# Patient Record
Sex: FEMALE | Race: WHITE | URBAN - METROPOLITAN AREA
[De-identification: names, ages, dates, MRNs, and addresses within clinical notes are randomized per-mention and may not be internally consistent; named-entity substitution may affect disease eponyms.]

---

## 2017-03-02 PROBLEM — Z00.00 ENCOUNTER FOR PREVENTIVE HEALTH EXAMINATION: Noted: 2017-03-02

## 2017-05-19 ENCOUNTER — INPATIENT (INPATIENT)
Facility: HOSPITAL | Age: 82
LOS: 2 days | Discharge: HOME | End: 2017-05-22
Attending: HOSPITALIST | Admitting: FAMILY MEDICINE
Payer: MEDICARE

## 2017-05-19 VITALS
TEMPERATURE: 98 F | WEIGHT: 139.99 LBS | RESPIRATION RATE: 18 BRPM | SYSTOLIC BLOOD PRESSURE: 103 MMHG | DIASTOLIC BLOOD PRESSURE: 61 MMHG | HEART RATE: 110 BPM | OXYGEN SATURATION: 100 %

## 2017-05-19 LAB
ALBUMIN SERPL ELPH-MCNC: 3.6 G/DL — SIGNIFICANT CHANGE UP (ref 3.3–5)
ALP SERPL-CCNC: 73 U/L — SIGNIFICANT CHANGE UP (ref 40–120)
ALT FLD-CCNC: 17 U/L — SIGNIFICANT CHANGE UP (ref 12–78)
ANION GAP SERPL CALC-SCNC: 11 MMOL/L — SIGNIFICANT CHANGE UP (ref 5–17)
AST SERPL-CCNC: 12 U/L — LOW (ref 15–37)
BASOPHILS # BLD AUTO: 0.1 K/UL — SIGNIFICANT CHANGE UP (ref 0–0.2)
BASOPHILS NFR BLD AUTO: 0.9 % — SIGNIFICANT CHANGE UP (ref 0–2)
BILIRUB SERPL-MCNC: 0.4 MG/DL — SIGNIFICANT CHANGE UP (ref 0.2–1.2)
BUN SERPL-MCNC: 15 MG/DL — SIGNIFICANT CHANGE UP (ref 7–23)
CALCIUM SERPL-MCNC: 9 MG/DL — SIGNIFICANT CHANGE UP (ref 8.5–10.1)
CHLORIDE SERPL-SCNC: 110 MMOL/L — HIGH (ref 96–108)
CO2 SERPL-SCNC: 25 MMOL/L — SIGNIFICANT CHANGE UP (ref 22–31)
CREAT SERPL-MCNC: 0.91 MG/DL — SIGNIFICANT CHANGE UP (ref 0.5–1.3)
EOSINOPHIL # BLD AUTO: 0.1 K/UL — SIGNIFICANT CHANGE UP (ref 0–0.5)
EOSINOPHIL NFR BLD AUTO: 0.9 % — SIGNIFICANT CHANGE UP (ref 0–6)
GLUCOSE SERPL-MCNC: 97 MG/DL — SIGNIFICANT CHANGE UP (ref 70–99)
HCT VFR BLD CALC: 41.4 % — SIGNIFICANT CHANGE UP (ref 34.5–45)
HGB BLD-MCNC: 13.7 G/DL — SIGNIFICANT CHANGE UP (ref 11.5–15.5)
LYMPHOCYTES # BLD AUTO: 2.1 K/UL — SIGNIFICANT CHANGE UP (ref 1–3.3)
LYMPHOCYTES # BLD AUTO: 30.2 % — SIGNIFICANT CHANGE UP (ref 13–44)
MCHC RBC-ENTMCNC: 31.9 PG — SIGNIFICANT CHANGE UP (ref 27–34)
MCHC RBC-ENTMCNC: 33.1 GM/DL — SIGNIFICANT CHANGE UP (ref 32–36)
MCV RBC AUTO: 96.6 FL — SIGNIFICANT CHANGE UP (ref 80–100)
MONOCYTES # BLD AUTO: 0.5 K/UL — SIGNIFICANT CHANGE UP (ref 0–0.9)
MONOCYTES NFR BLD AUTO: 7.7 % — SIGNIFICANT CHANGE UP (ref 2–14)
NEUTROPHILS # BLD AUTO: 4.2 K/UL — SIGNIFICANT CHANGE UP (ref 1.8–7.4)
NEUTROPHILS NFR BLD AUTO: 60.4 % — SIGNIFICANT CHANGE UP (ref 43–77)
PLATELET # BLD AUTO: 207 K/UL — SIGNIFICANT CHANGE UP (ref 150–400)
POTASSIUM SERPL-MCNC: 4 MMOL/L — SIGNIFICANT CHANGE UP (ref 3.5–5.3)
POTASSIUM SERPL-SCNC: 4 MMOL/L — SIGNIFICANT CHANGE UP (ref 3.5–5.3)
PROT SERPL-MCNC: 6.6 GM/DL — SIGNIFICANT CHANGE UP (ref 6–8.3)
RBC # BLD: 4.29 M/UL — SIGNIFICANT CHANGE UP (ref 3.8–5.2)
RBC # FLD: 13.8 % — SIGNIFICANT CHANGE UP (ref 10.3–14.5)
SODIUM SERPL-SCNC: 146 MMOL/L — HIGH (ref 135–145)
TROPONIN I SERPL-MCNC: <0.015 NG/ML — SIGNIFICANT CHANGE UP (ref 0.01–0.04)
TSH SERPL-MCNC: 4.08 UIU/ML — HIGH (ref 0.36–3.74)
WBC # BLD: 6.9 K/UL — SIGNIFICANT CHANGE UP (ref 3.8–10.5)
WBC # FLD AUTO: 6.9 K/UL — SIGNIFICANT CHANGE UP (ref 3.8–10.5)

## 2017-05-19 PROCEDURE — 99285 EMERGENCY DEPT VISIT HI MDM: CPT

## 2017-05-19 PROCEDURE — 93010 ELECTROCARDIOGRAM REPORT: CPT

## 2017-05-19 PROCEDURE — 71010: CPT | Mod: 26

## 2017-05-19 RX ORDER — LISINOPRIL 2.5 MG/1
5 TABLET ORAL DAILY
Qty: 0 | Refills: 0 | Status: DISCONTINUED | OUTPATIENT
Start: 2017-05-19 | End: 2017-05-20

## 2017-05-19 RX ORDER — DULOXETINE HYDROCHLORIDE 30 MG/1
60 CAPSULE, DELAYED RELEASE ORAL DAILY
Qty: 0 | Refills: 0 | Status: DISCONTINUED | OUTPATIENT
Start: 2017-05-19 | End: 2017-05-22

## 2017-05-19 RX ORDER — METOPROLOL TARTRATE 50 MG
25 TABLET ORAL
Qty: 0 | Refills: 0 | Status: DISCONTINUED | OUTPATIENT
Start: 2017-05-19 | End: 2017-05-22

## 2017-05-19 RX ORDER — RIVAROXABAN 15 MG-20MG
20 KIT ORAL DAILY
Qty: 0 | Refills: 0 | Status: DISCONTINUED | OUTPATIENT
Start: 2017-05-19 | End: 2017-05-22

## 2017-05-19 RX ORDER — DOCUSATE SODIUM 100 MG
100 CAPSULE ORAL DAILY
Qty: 0 | Refills: 0 | Status: DISCONTINUED | OUTPATIENT
Start: 2017-05-19 | End: 2017-05-22

## 2017-05-19 RX ORDER — DONEPEZIL HYDROCHLORIDE 10 MG/1
5 TABLET, FILM COATED ORAL AT BEDTIME
Qty: 0 | Refills: 0 | Status: DISCONTINUED | OUTPATIENT
Start: 2017-05-19 | End: 2017-05-22

## 2017-05-19 RX ORDER — FOLIC ACID/VIT B COMPLEX AND C 400 MCG
0 TABLET ORAL
Qty: 0 | Refills: 0 | COMMUNITY

## 2017-05-19 RX ORDER — MEMANTINE HYDROCHLORIDE 10 MG/1
10 TABLET ORAL AT BEDTIME
Qty: 0 | Refills: 0 | Status: DISCONTINUED | OUTPATIENT
Start: 2017-05-19 | End: 2017-05-22

## 2017-05-19 RX ORDER — ACETAMINOPHEN 500 MG
650 TABLET ORAL EVERY 6 HOURS
Qty: 0 | Refills: 0 | Status: DISCONTINUED | OUTPATIENT
Start: 2017-05-19 | End: 2017-05-22

## 2017-05-19 RX ADMIN — DONEPEZIL HYDROCHLORIDE 5 MILLIGRAM(S): 10 TABLET, FILM COATED ORAL at 23:51

## 2017-05-19 RX ADMIN — DULOXETINE HYDROCHLORIDE 60 MILLIGRAM(S): 30 CAPSULE, DELAYED RELEASE ORAL at 17:40

## 2017-05-19 RX ADMIN — MEMANTINE HYDROCHLORIDE 10 MILLIGRAM(S): 10 TABLET ORAL at 23:50

## 2017-05-19 RX ADMIN — RIVAROXABAN 20 MILLIGRAM(S): KIT at 17:06

## 2017-05-19 RX ADMIN — Medication 1 TABLET(S): at 17:41

## 2017-05-19 RX ADMIN — Medication 100 MILLIGRAM(S): at 17:40

## 2017-05-19 NOTE — CONSULT NOTE ADULT - SUBJECTIVE AND OBJECTIVE BOX
Patient is a 89y old  Female who presents with a chief complaint of  tachycardia from NH.  Pt was seen by Dr Casanova as outpt and was sent to ER.  Denies any chest pain or SOB.        PAST MEDICAL & SURGICAL HISTORY:  HTN, Dementia      MEDICATIONS  (STANDING):    MEDICATIONS  (PRN):      FAMILY HISTORY:  No family history of premature CAD or SCD.        SOCIAL HISTORY:    REVIEW OF SYSTEMS:  CONSTITUTIONAL:  No night sweats.  No fatigue, malaise, lethargy.  No fever or chills.  HEENT:  Eyes:  No visual changes.  No eye pain.      ENT:  No runny nose.  No epistaxis.  No sinus pain.  No sore throat.  No odynophagia.  No ear pain.  No congestion.  RESPIRATORY:  No cough.  No wheeze.  No hemoptysis.  No shortness of breath.  CARDIOVASCULAR:  No chest pains.  No palpitations. No shortness of breath, No orthopnea or PND.  GASTROINTESTINAL:  No abdominal pain.  No nausea or vomiting.  No diarrhea or constipation.  No hematemesis.  No hematochezia.  No melena.  GENITOURINARY:  No urgency.  No frequency.  No dysuria.  No hematuria.  No obstructive symptoms.  No discharge.  No pain.  No significant abnormal bleeding.  MUSCULOSKELETAL:  No musculoskeletal pain.  No joint swelling.  No arthritis.  NEUROLOGICAL:  No tingling or numbness or weakness.  PSYCHIATRIC:  No confusion  SKIN:  No rashes.  No lesions.  No wounds.  ENDOCRINE:  No unexplained weight loss.  No polydipsia.  No polyuria.  No polyphagia.  HEMATOLOGIC:  No anemia.  No purpura.  No petechiae.  No prolonged or excessive bleeding.   ALLERGIC AND IMMUNOLOGIC:  No pruritus.  No swelling.         Vital Signs Last 24 Hrs  T(C): 36.7, Max: 36.7 (05-19 @ 12:36)  T(F): 98.1, Max: 98.1 (05-19 @ 12:36)  HR: 110 (110 - 110)  BP: 103/61 (103/61 - 103/61)  BP(mean): --  RR: 18 (18 - 18)  SpO2: 100% (100% - 100%)    PHYSICAL EXAM-    Constitutional: The patient appears to be normal, well developed, well nourished and alert and oriented to time, place and person. The patient does not appear acutely ill.     Head: Head is normocephalic and atraumatic.      Neck: The patient's neck is supple without enlargement, has no palpable thyromegaly nor thyroid nodules and has no jugular venous distention. No audible carotid bruits. There are strong carotid pulses bilaterally. No JVD.     Cardiovascular: irregular rate and rhythm without S3, S4. No murmurs or rubs are appreciated.      Respiratory: Breath sounds are normal. No rales. No wheezing.    Abdomen: Soft, nontender, nondistended with positive bowel sounds.      Extremity: No tenderness. No  pitting edema No skin discoloration No clubbing No cyanosis.     Neurologic: The patient is alert and oriented.      Skin: No rash, no obvious lesions noted.      Psychiatric: The patient appears to be emotionally stable.      INTERPRETATION OF TELEMETRY: atrial fibrillation     ECG:atrial fibrillation with RVR, poor R wave progression    I&O's Detail      LABS:                        13.7   6.9   )-----------( 207      ( 19 May 2017 12:43 )             41.4     05-19    146<H>  |  110<H>  |  15  ----------------------------<  97  4.0   |  25  |  0.91    Ca    9.0      19 May 2017 12:43    TPro  6.6  /  Alb  3.6  /  TBili  0.4  /  DBili  x   /  AST  12<L>  /  ALT  17  /  AlkPhos  73  05-19    CARDIAC MARKERS ( 19 May 2017 12:43 )  <0.015 ng/mL / x     / x     / x     / x          PT/INR - ( 19 May 2017 14:11 )   PT: 10.7 sec;   INR: 0.99 ratio         PTT - ( 19 May 2017 14:11 )  PTT:30.1 sec    I&O's Summary    BNP  RADIOLOGY & ADDITIONAL STUDIES:

## 2017-05-19 NOTE — H&P ADULT - ASSESSMENT
Pt is admitted w/    I. Afib w/RVR,  CHADS 4  - TSH  - cardizem  - coumadin  - apprec cardiology consult    II. Short term memory loss  - cont meds Pt is a 89 y/o woman with PMedHX:  HTN, short term memory loss who has been living at [facility] since Sept 2016. Pt went to see the physician at [facility] for routine eval and it was noted that she had  tachycardia at 120bpm.  Pt was seen by her cardiologist,  Dr. Casanova who found the pt to be in new A-fib.  Dr. Casanova referred the pt to the ED for admission for Afib and possible cardioversion next week on Monday.  Pt's daughter has noticed that the pt has been more fatigued than usual over the last 3 weeks.  No cpain/SOB/palpitations/dizziness.  No n/v/f.    Pt is admitted w/    I. Afib w/RVR,  CHADS 4  - TSH is elevated, will check TFTs  - hold lisinopril  - cardizem 5mg x1 in   - metoprolol 25mg BID  - pt was started on xaralto in the ER  - apprec cardiology consult Dr. Cash  - possible cardioversion on Monday    II. Short term memory loss  - cont meds    III. DVT prophylaxis  - started on xaralto

## 2017-05-19 NOTE — H&P ADULT - NSHPREVIEWOFSYSTEMS_GEN_ALL_CORE
ICU Vital Signs Last 24 Hrs    T(F): 97.8, Max: 98.2 (05-19 @ 17:45)    HR: 100 (100 - 116)    BP: 126/69 (103/61 - 135/79)  RR: 14 (14 - 18) SpO2: 94% (94% - 100%)

## 2017-05-19 NOTE — ED STATDOCS - PROGRESS NOTE DETAILS
89 y/o female sent to T by Dr. Casanova, cardio who noted Afib. Pt currently lives at [facility]. Tachycardia was first noted by routine doctor at [facility]. Pt saw Dr. Casanova 2 hours ago who sent her to ED. No c/o at this time. Denies CP, SOb. Daughter at bedside noted that Pt is not like herself. Will be sent to Main ED for further evaluation.

## 2017-05-19 NOTE — ED PROVIDER NOTE - NS ED MD SCRIBE ATTENDING SCRIBE SECTIONS
PAST MEDICAL/SURGICAL/SOCIAL HISTORY/PHYSICAL EXAM/HISTORY OF PRESENT ILLNESS/VITAL SIGNS( Pullset)/DISPOSITION/HIV/REVIEW OF SYSTEMS/RESULTS

## 2017-05-19 NOTE — CONSULT NOTE ADULT - ASSESSMENT
Newly diagnosed afib with RVR- will start her on po metoprolol 25mg po bid.  Monitoring of vitals closely recommended.  CHADVasc score is at least 4. Will start her on couamdin. Risks and benefits discussed with daughter at bedside at length. She expressed full understanding and agreed for treatment.    HTN- hold outpt meds and start metoprolol.  Thank you for allowing me to participate in the care of this patient. Please feel free to contact me with any questions.

## 2017-05-19 NOTE — H&P ADULT - HISTORY OF PRESENT ILLNESS
Pt is a 89 y/o F w/pmhx of HTN, short term memory loss lives in [facility]. Was seen by the attending there who noticed the pt was tachycardiac although the pt has no cardiac hx. Was seen by Dr. Casanova who found the pt to be in A-fib, which is new for the pt. Dr. Casanova referred the pt to the ED for admission for Afib and possible cardioversion next week on Monday. Pt is a 89 y/o woman with PMedHX:  HTN, short term memory loss who has been living at [facility] since Sept 2016. Pt went to see the physician at [facility], Dr. Montilla for routine eval and it was noted that she had  tachycardia at 120bpm.  Pt was seen by her cardiologist,  Dr. Casanova who found the pt to be in new A-fib.  Dr. Casanova referred the pt to the ED for admission for Afib and possible cardioversion next week on Monday.  Pt's daughter has noticed that the pt has been more fatigued than usual over the last 3 weeks.  No cpain/SOB/palpitations/dizziness.  No n/v/f. Pt is a 89 y/o woman with PMedHX:  HTN, short term memory loss who has been living at [facility] since Sept 2016. Pt went to see the physician at [facility], Dr. Montilla for routine eval and it was noted that she had  tachycardia at 120bpm.  Pt was seen by her cardiologist,  Dr. Casanova who found the pt to be in new A-fib.  Dr. Casanova referred the pt to the ED for admission for Afib and possible cardioversion next week on Monday.  Pt's daughter has noticed that the pt has been more fatigued than usual over the last 3 weeks.  No cpain/SOB/palpitations/dizziness.  No n/v/f.     PMed Surg HX:    HTN,   short term memory loss  Spinal surg , spinal stenosis    Fam HX:   non-contrib to current adm

## 2017-05-19 NOTE — ED PROVIDER NOTE - MEDICAL DECISION MAKING DETAILS
89 y/o F presents to the ED with new onset of Afib. Will get EKG, cardiac monitoring, labs and plan to control cardiac rate as needed. Plan to admit.

## 2017-05-20 DIAGNOSIS — M54.5 LOW BACK PAIN: ICD-10-CM

## 2017-05-20 DIAGNOSIS — R41.3 OTHER AMNESIA: ICD-10-CM

## 2017-05-20 DIAGNOSIS — I48.91 UNSPECIFIED ATRIAL FIBRILLATION: ICD-10-CM

## 2017-05-20 DIAGNOSIS — I10 ESSENTIAL (PRIMARY) HYPERTENSION: ICD-10-CM

## 2017-05-20 LAB
ANION GAP SERPL CALC-SCNC: 7 MMOL/L — SIGNIFICANT CHANGE UP (ref 5–17)
BUN SERPL-MCNC: 16 MG/DL — SIGNIFICANT CHANGE UP (ref 7–23)
CALCIUM SERPL-MCNC: 8.8 MG/DL — SIGNIFICANT CHANGE UP (ref 8.5–10.1)
CHLORIDE SERPL-SCNC: 110 MMOL/L — HIGH (ref 96–108)
CO2 SERPL-SCNC: 27 MMOL/L — SIGNIFICANT CHANGE UP (ref 22–31)
CREAT SERPL-MCNC: 0.72 MG/DL — SIGNIFICANT CHANGE UP (ref 0.5–1.3)
GLUCOSE SERPL-MCNC: 88 MG/DL — SIGNIFICANT CHANGE UP (ref 70–99)
POTASSIUM SERPL-MCNC: 3.8 MMOL/L — SIGNIFICANT CHANGE UP (ref 3.5–5.3)
POTASSIUM SERPL-SCNC: 3.8 MMOL/L — SIGNIFICANT CHANGE UP (ref 3.5–5.3)
SODIUM SERPL-SCNC: 144 MMOL/L — SIGNIFICANT CHANGE UP (ref 135–145)
T3 SERPL-MCNC: 104 NG/DL — SIGNIFICANT CHANGE UP (ref 80–200)
T4 AB SER-ACNC: 7.1 UG/DL — SIGNIFICANT CHANGE UP (ref 4.6–12)
TSH SERPL-MCNC: 2.65 UIU/ML — SIGNIFICANT CHANGE UP (ref 0.36–3.74)

## 2017-05-20 RX ADMIN — DONEPEZIL HYDROCHLORIDE 5 MILLIGRAM(S): 10 TABLET, FILM COATED ORAL at 21:07

## 2017-05-20 RX ADMIN — Medication 25 MILLIGRAM(S): at 18:30

## 2017-05-20 RX ADMIN — Medication 25 MILLIGRAM(S): at 05:50

## 2017-05-20 RX ADMIN — Medication 1 TABLET(S): at 12:26

## 2017-05-20 RX ADMIN — Medication 100 MILLIGRAM(S): at 12:27

## 2017-05-20 RX ADMIN — RIVAROXABAN 20 MILLIGRAM(S): KIT at 12:26

## 2017-05-20 RX ADMIN — MEMANTINE HYDROCHLORIDE 10 MILLIGRAM(S): 10 TABLET ORAL at 21:07

## 2017-05-20 RX ADMIN — DULOXETINE HYDROCHLORIDE 60 MILLIGRAM(S): 30 CAPSULE, DELAYED RELEASE ORAL at 12:27

## 2017-05-20 NOTE — PROGRESS NOTE ADULT - ASSESSMENT
Pt is a 89 y/o woman with PMedHX:  HTN, short term memory loss found to have HR of 120's and is in new onset Atrial fibrillation  c/w Telemetry monitoring.

## 2017-05-20 NOTE — PROGRESS NOTE ADULT - PROBLEM SELECTOR PLAN 1
c/w telemetry monitoring  patient on Xarelto  c/w lopressor 25 mg PO BID  NPO on sunday night for Cardioversion on Monday

## 2017-05-20 NOTE — PROGRESS NOTE ADULT - SUBJECTIVE AND OBJECTIVE BOX
Patient is a 89y old  Female who presents with a chief complaint of afib (19 May 2017 17:09)        HPI:  Pt is a 89 y/o woman with PMedHX:  HTN, short term memory loss who has been living at [facility] since Sept 2016. Pt went to see the physician at [facility], Dr. Montilla for routine eval and it was noted that she had  tachycardia at 120bpm.  Pt was seen by her cardiologist,  Dr. Casanova who found the pt to be in new A-fib.  Dr. Casanova referred the pt to the ED for admission for Afib and possible cardioversion next week on Monday.  Pt's daughter has noticed that the pt has been more fatigued than usual over the last 3 weeks.  No cpain/SOB/palpitations/dizziness.  No n/v/f.     PMed Surg HX:    HTN,   short term memory loss  Spinal surg , spinal stenosis    Fam HX:   non-contrib to current adm (19 May 2017 17:09)      SUBJECTIVE & OBJECTIVE: 5/20/17 Pt seen and examined at bedside this afternoon.  She is pleasant but confused.  Denies SOB, Chest pain, or other symptoms.    PHYSICAL EXAM:  T(C): 36.4, Max: 36.8 (05-19 @ 17:45)  HR: 92 (92 - 116)  BP: 108/65 (108/65 - 151/96)  RR: 16 (14 - 18)  SpO2: 95% (93% - 100%)  Wt(kg): --   GENERAL: NAD, well-groomed, well-developed  HEAD:  Atraumatic, Normocephalic  EYES: EOMI, PERRLA, conjunctiva and sclera clear  ENMT: Moist mucous membranes  NECK: Supple, No JVD  NERVOUS SYSTEM:  Alert & Oriented X3, Motor Strength 5/5 B/L upper and lower extremities; DTRs 2+ intact and symmetric  CHEST/LUNG: Clear to auscultation bilaterally; No rales, rhonchi, wheezing, or rubs  HEART: irregularly irregular HR controlled at 70 bpm  ABDOMEN: Soft, Nontender, Nondistended; Bowel sounds present  EXTREMITIES:  2+ Peripheral Pulses, No clubbing, cyanosis, or edema  BACK: Vetebral tenderness at L4-L5, well healed surgical scar.        MEDICATIONS  (STANDING):  rivaroxaban 20milliGRAM(s) Oral daily  DULoxetine 60milliGRAM(s) Oral daily  donepezil 5milliGRAM(s) Oral at bedtime  docusate sodium 100milliGRAM(s) Oral daily  multivitamin 1Tablet(s) Oral daily  memantine 10milliGRAM(s) Oral at bedtime  metoprolol 25milliGRAM(s) Oral two times a day    MEDICATIONS  (PRN):  acetaminophen   Tablet. 650milliGRAM(s) Oral every 6 hours PRN Mild Pain (1 - 3)  oxyCODONE  5 mG/acetaminophen 325 mG 0.5Tablet(s) Oral every 6 hours PRN Moderate Pain (4 - 6)      LABS:                        13.7   6.9   )-----------( 207      ( 19 May 2017 12:43 )             41.4     05-20    144  |  110<H>  |  16  ----------------------------<  88  3.8   |  27  |  0.72    Ca    8.8      20 May 2017 06:43    TPro  6.6  /  Alb  3.6  /  TBili  0.4  /  DBili  x   /  AST  12<L>  /  ALT  17  /  AlkPhos  73  05-19    PT/INR - ( 19 May 2017 14:11 )   PT: 10.7 sec;   INR: 0.99 ratio         PTT - ( 19 May 2017 14:11 )  PTT:30.1 sec    CARDIAC MARKERS ( 19 May 2017 20:14 )  <0.015 ng/mL / x     / x     / x     / x      CARDIAC MARKERS ( 19 May 2017 16:22 )  <0.015 ng/mL / x     / x     / x     / x      CARDIAC MARKERS ( 19 May 2017 12:43 )  <0.015 ng/mL / x     / x     / x     / x            RECENT CULTURES:      RADIOLOGY & ADDITIONAL TESTS:  Chest X RAY  IMPRESSION:          The lungs are clear.  No pleural abnormality is seen.      Cardiomediastinal silhouette is intact.                      DVT/GI ppx  Discussed with pt @ bedside

## 2017-05-21 LAB
ANION GAP SERPL CALC-SCNC: 10 MMOL/L — SIGNIFICANT CHANGE UP (ref 5–17)
BUN SERPL-MCNC: 27 MG/DL — HIGH (ref 7–23)
CALCIUM SERPL-MCNC: 9.2 MG/DL — SIGNIFICANT CHANGE UP (ref 8.5–10.1)
CHLORIDE SERPL-SCNC: 108 MMOL/L — SIGNIFICANT CHANGE UP (ref 96–108)
CO2 SERPL-SCNC: 26 MMOL/L — SIGNIFICANT CHANGE UP (ref 22–31)
CREAT SERPL-MCNC: 0.77 MG/DL — SIGNIFICANT CHANGE UP (ref 0.5–1.3)
GLUCOSE SERPL-MCNC: 86 MG/DL — SIGNIFICANT CHANGE UP (ref 70–99)
MAGNESIUM SERPL-MCNC: 2.2 MG/DL — SIGNIFICANT CHANGE UP (ref 1.6–2.6)
PHOSPHATE SERPL-MCNC: 3.4 MG/DL — SIGNIFICANT CHANGE UP (ref 2.5–4.5)
POTASSIUM SERPL-MCNC: 3.9 MMOL/L — SIGNIFICANT CHANGE UP (ref 3.5–5.3)
POTASSIUM SERPL-SCNC: 3.9 MMOL/L — SIGNIFICANT CHANGE UP (ref 3.5–5.3)
SODIUM SERPL-SCNC: 144 MMOL/L — SIGNIFICANT CHANGE UP (ref 135–145)

## 2017-05-21 RX ORDER — SODIUM CHLORIDE 9 MG/ML
1000 INJECTION, SOLUTION INTRAVENOUS
Qty: 0 | Refills: 0 | Status: DISCONTINUED | OUTPATIENT
Start: 2017-05-22 | End: 2017-05-22

## 2017-05-21 RX ADMIN — DULOXETINE HYDROCHLORIDE 60 MILLIGRAM(S): 30 CAPSULE, DELAYED RELEASE ORAL at 12:36

## 2017-05-21 RX ADMIN — Medication 1 TABLET(S): at 12:36

## 2017-05-21 RX ADMIN — RIVAROXABAN 20 MILLIGRAM(S): KIT at 12:36

## 2017-05-21 RX ADMIN — Medication 25 MILLIGRAM(S): at 17:28

## 2017-05-21 RX ADMIN — DONEPEZIL HYDROCHLORIDE 5 MILLIGRAM(S): 10 TABLET, FILM COATED ORAL at 22:14

## 2017-05-21 RX ADMIN — MEMANTINE HYDROCHLORIDE 10 MILLIGRAM(S): 10 TABLET ORAL at 22:14

## 2017-05-21 RX ADMIN — Medication 100 MILLIGRAM(S): at 12:36

## 2017-05-21 RX ADMIN — Medication 25 MILLIGRAM(S): at 06:28

## 2017-05-21 NOTE — PROGRESS NOTE ADULT - SUBJECTIVE AND OBJECTIVE BOX
Patient is a 89y old  Female who presents with a chief complaint of afib (19 May 2017 17:09)        HPI:  Pt is a 89 y/o woman with PMedHX:  HTN, short term memory loss who has been living at [facility] since Sept 2016. Pt went to see the physician at [facility], Dr. Montilla for routine eval and it was noted that she had  tachycardia at 120bpm.  Pt was seen by her cardiologist,  Dr. Casanova who found the pt to be in new A-fib.  Dr. Casanova referred the pt to the ED for admission for Afib and possible cardioversion next week on Monday.  Pt's daughter has noticed that the pt has been more fatigued than usual over the last 3 weeks.  No cpain/SOB/palpitations/dizziness.  No n/v/f.    SUBJECTIVE & OBJECTIVE:  5/20/17 Pt seen and examined at bedside this afternoon.  She is pleasant but confused.  Denies SOB, Chest pain, or other symptoms.   5/21/17 Pt seen and examined, telemetry reviewed.  Is in Atrial fibrillation with max 's  currently rate controlled.  No acute events or complaints overnight.      PHYSICAL EXAM:  T(C): 36.3, Max: 36.8 (05-20 @ 18:15)  HR: 83 (83 - 96)  BP: 123/58 (123/58 - 135/70)  RR: 16 (16 - 18)  SpO2: 95% (93% - 96%)  Wt(kg): --   GENERAL: NAD, well-groomed, well-developed  HEAD:  Atraumatic, Normocephalic  EYES: EOMI, PERRLA, conjunctiva and sclera clear  ENMT: Moist mucous membranes  NECK: Supple, No JVD  NERVOUS SYSTEM:  Alert & Oriented X3, Motor Strength 5/5 B/L upper and lower extremities; DTRs 2+ intact and symmetric  CHEST/LUNG: Clear to auscultation bilaterally; No rales, rhonchi, wheezing, or rubs  HEART: irregularly irregular HR  ABDOMEN: Soft, Nontender, Nondistended; Bowel sounds present  EXTREMITIES:  2+ Peripheral Pulses, No clubbing, cyanosis, or edema        MEDICATIONS  (STANDING):  rivaroxaban 20milliGRAM(s) Oral daily  DULoxetine 60milliGRAM(s) Oral daily  donepezil 5milliGRAM(s) Oral at bedtime  docusate sodium 100milliGRAM(s) Oral daily  multivitamin 1Tablet(s) Oral daily  memantine 10milliGRAM(s) Oral at bedtime  metoprolol 25milliGRAM(s) Oral two times a day    MEDICATIONS  (PRN):  acetaminophen   Tablet. 650milliGRAM(s) Oral every 6 hours PRN Mild Pain (1 - 3)  oxyCODONE  5 mG/acetaminophen 325 mG 0.5Tablet(s) Oral every 6 hours PRN Moderate Pain (4 - 6)      LABS:    05-21    144  |  108  |  27<H>  ----------------------------<  86  3.9   |  26  |  0.77    Ca    9.2      21 May 2017 06:13  Phos  3.4     05-21  Mg     2.2     05-21      CARDIAC MARKERS ( 19 May 2017 20:14 )  <0.015 ng/mL / x     / x     / x     / x      CARDIAC MARKERS ( 19 May 2017 16:22 )  <0.015 ng/mL / x     / x     / x     / x        RADIOLOGY & ADDITIONAL TESTS:    DVT/GI ppx  Discussed with pt @ bedside

## 2017-05-21 NOTE — PROGRESS NOTE ADULT - ASSESSMENT
Pt is a 89 y/o woman with PMedHX:  HTN, short term memory loss found to have HR of 120's and is in new onset Atrial fibrillation  c/w Telemetry monitoring.   NPO after midnight for Cardioversion in AM  Gentle IVF while NPO    Problem/Plan - 1:  ·  Problem: New onset atrial fibrillation.  Plan: c/w telemetry monitoring  patient on Xarelto  c/w lopressor 25 mg PO BID  NPO on sunday night for Cardioversion on Monday.     Problem/Plan - 2:  ·  Problem: Essential hypertension.  Plan: as above.     Problem/Plan - 3:  ·  Problem: Memory loss, short term.  Plan: c/w mementine, donezpil, and other home medications.     Problem/Plan - 4:  ·  Problem: Bilateral low back pain without sciatica, unspecified chronicity.  Plan: Tramadol 25 mg  Q 8 hours  Tylenol and icepacks for symmptomatic releif.

## 2017-05-22 VITALS
TEMPERATURE: 98 F | SYSTOLIC BLOOD PRESSURE: 142 MMHG | HEART RATE: 85 BPM | OXYGEN SATURATION: 96 % | RESPIRATION RATE: 18 BRPM | DIASTOLIC BLOOD PRESSURE: 80 MMHG

## 2017-05-22 LAB
ANION GAP SERPL CALC-SCNC: 9 MMOL/L — SIGNIFICANT CHANGE UP (ref 5–17)
BUN SERPL-MCNC: 21 MG/DL — SIGNIFICANT CHANGE UP (ref 7–23)
CALCIUM SERPL-MCNC: 8.9 MG/DL — SIGNIFICANT CHANGE UP (ref 8.5–10.1)
CHLORIDE SERPL-SCNC: 111 MMOL/L — HIGH (ref 96–108)
CO2 SERPL-SCNC: 24 MMOL/L — SIGNIFICANT CHANGE UP (ref 22–31)
CREAT SERPL-MCNC: 0.66 MG/DL — SIGNIFICANT CHANGE UP (ref 0.5–1.3)
GLUCOSE SERPL-MCNC: 83 MG/DL — SIGNIFICANT CHANGE UP (ref 70–99)
HCT VFR BLD CALC: 39.1 % — SIGNIFICANT CHANGE UP (ref 34.5–45)
HGB BLD-MCNC: 12.6 G/DL — SIGNIFICANT CHANGE UP (ref 11.5–15.5)
MAGNESIUM SERPL-MCNC: 2 MG/DL — SIGNIFICANT CHANGE UP (ref 1.6–2.6)
MCHC RBC-ENTMCNC: 31.4 PG — SIGNIFICANT CHANGE UP (ref 27–34)
MCHC RBC-ENTMCNC: 32.1 GM/DL — SIGNIFICANT CHANGE UP (ref 32–36)
MCV RBC AUTO: 97.9 FL — SIGNIFICANT CHANGE UP (ref 80–100)
PHOSPHATE SERPL-MCNC: 3.6 MG/DL — SIGNIFICANT CHANGE UP (ref 2.5–4.5)
PLATELET # BLD AUTO: 177 K/UL — SIGNIFICANT CHANGE UP (ref 150–400)
POTASSIUM SERPL-MCNC: 3.7 MMOL/L — SIGNIFICANT CHANGE UP (ref 3.5–5.3)
POTASSIUM SERPL-SCNC: 3.7 MMOL/L — SIGNIFICANT CHANGE UP (ref 3.5–5.3)
RBC # BLD: 4 M/UL — SIGNIFICANT CHANGE UP (ref 3.8–5.2)
RBC # FLD: 13.8 % — SIGNIFICANT CHANGE UP (ref 10.3–14.5)
SODIUM SERPL-SCNC: 144 MMOL/L — SIGNIFICANT CHANGE UP (ref 135–145)
WBC # BLD: 6.8 K/UL — SIGNIFICANT CHANGE UP (ref 3.8–10.5)
WBC # FLD AUTO: 6.8 K/UL — SIGNIFICANT CHANGE UP (ref 3.8–10.5)

## 2017-05-22 RX ORDER — METOPROLOL TARTRATE 50 MG
1 TABLET ORAL
Qty: 60 | Refills: 0 | OUTPATIENT
Start: 2017-05-22 | End: 2017-06-21

## 2017-05-22 RX ORDER — OXYCODONE HYDROCHLORIDE 5 MG/1
0.5 TABLET ORAL
Qty: 0 | Refills: 0 | COMMUNITY
Start: 2017-05-22

## 2017-05-22 RX ORDER — APIXABAN 2.5 MG/1
1 TABLET, FILM COATED ORAL
Qty: 60 | Refills: 0 | OUTPATIENT
Start: 2017-05-22 | End: 2017-06-21

## 2017-05-22 RX ORDER — ACETAMINOPHEN 500 MG
2 TABLET ORAL
Qty: 0 | Refills: 0 | COMMUNITY
Start: 2017-05-22

## 2017-05-22 RX ADMIN — RIVAROXABAN 20 MILLIGRAM(S): KIT at 12:13

## 2017-05-22 RX ADMIN — SODIUM CHLORIDE 60 MILLILITER(S): 9 INJECTION, SOLUTION INTRAVENOUS at 00:03

## 2017-05-22 RX ADMIN — Medication 100 MILLIGRAM(S): at 12:13

## 2017-05-22 RX ADMIN — DULOXETINE HYDROCHLORIDE 60 MILLIGRAM(S): 30 CAPSULE, DELAYED RELEASE ORAL at 12:13

## 2017-05-22 RX ADMIN — Medication 1 TABLET(S): at 12:13

## 2017-05-22 NOTE — DISCHARGE NOTE ADULT - HOSPITAL COURSE
Assessment and Plan:   89y female admitted w/    *new onset rapid Afib   - rate now controlled on BB  - discussed w/ Cardio- no plan for DCCV  - change AC to eliquis for discharge   - follow up in office    *HTN  - controlled    *dementia  - namenda, aricept    *dvt px       Time to d/c >55 min.

## 2017-05-22 NOTE — DISCHARGE NOTE ADULT - CARE PLAN
Principal Discharge DX:	New onset atrial fibrillation  Goal:	close follow up with your Cardiologist  Instructions for follow-up, activity and diet:	take new meds metoprolol and eliquis as prescribed.  Follow up with your Cardiologist within 1 week.

## 2017-05-22 NOTE — DISCHARGE NOTE ADULT - PLAN OF CARE
close follow up with your Cardiologist take new meds metoprolol and eliquis as prescribed.  Follow up with your Cardiologist within 1 week.

## 2017-05-22 NOTE — PROGRESS NOTE ADULT - SUBJECTIVE AND OBJECTIVE BOX
Cardiology Progress Note:    HPI: Pt is a 89 y/o woman with PMedHX:  HTN, short term memory loss who has been living at [facility] since Sept 2016. Pt went to see the physician at [facility], Dr. Montilla for routine eval and it was noted that she had  tachycardia at 120bpm.  Pt was seen by her cardiologist,  Dr. Casanova who found the pt to be in new A-fib.  Dr. Casanova referred the pt to the ED for admission for Afib and possible cardioversion next week on Monday.  Pt's daughter has noticed that the pt has been more fatigued than usual over the last 3 weeks.  No cpain/SOB/palpitations/dizziness.  No n/v/f.    5/22- No CP/SOB. No fevers. Tele- afib in 70-80s. Case d/w pt and daughter on phone.     PMed Surg HX: HTN, short term memory loss  Spinal surg , spinal stenosis    Allergies; No Known Allergies    SOCIAL HISTORY: Denies tobacco, etoh abuse or illicit drug use    FAMILY HISTORY: Noncontributory    MEDICATIONS  (STANDING):  rivaroxaban 20milliGRAM(s) Oral daily  DULoxetine 60milliGRAM(s) Oral daily  donepezil 5milliGRAM(s) Oral at bedtime  docusate sodium 100milliGRAM(s) Oral daily  multivitamin 1Tablet(s) Oral daily  memantine 10milliGRAM(s) Oral at bedtime  metoprolol 25milliGRAM(s) Oral two times a day  lactated ringers. 1000milliLiter(s) IV Continuous <Continuous>    MEDICATIONS  (PRN):  acetaminophen   Tablet. 650milliGRAM(s) Oral every 6 hours PRN Mild Pain (1 - 3)  oxyCODONE  5 mG/acetaminophen 325 mG 0.5Tablet(s) Oral every 6 hours PRN Moderate Pain (4 - 6)    Vital Signs Last 24 Hrs  T(C): 36.4, Max: 36.4 (05-21 @ 16:52)  T(F): 97.5, Max: 97.5 (05-21 @ 16:52)  HR: 82 (82 - 89)  BP: 135/79 (123/58 - 138/80)  BP(mean): --  RR: 16 (16 - 17)  SpO2: 97% (95% - 97%)    REVIEW OF SYSTEMS:    CONSTITUTIONAL:  As per HPI.  HEENT:  Eyes:  No diplopia or blurred vision. ENT:  No earache, sore throat or runny nose.  CARDIOVASCULAR:  No pressure, squeezing, strangling, tightness, heaviness or aching about the chest, neck, axilla or epigastrium.  RESPIRATORY:  No cough, shortness of breath, PND or orthopnea.  GASTROINTESTINAL:  No nausea, vomiting or diarrhea.  GENITOURINARY:  No dysuria, frequency or urgency.  MUSCULOSKELETAL:  As per HPI.  SKIN:  No change in skin, hair or nails.  NEUROLOGIC:  No paresthesias, fasciculations, seizures or weakness.  PSYCHIATRIC:  No disorder of thought or mood.  ENDOCRINE:  No heat or cold intolerance, polyuria or polydipsia.  HEMATOLOGICAL:  No easy bruising or bleedings.     PHYSICAL EXAMINATION:    GENERAL APPEARANCE:  Pt. is not currently dyspneic, in no distress. Pt. is alert, oriented, and pleasant.  HEENT:  Pupils are normal and react normally. No icterus. Mucous membranes well colored.  NECK:  Supple. No lymphadenopathy. Jugular venous pressure not elevated. Carotids equal.   HEART:   The cardiac impulse has a normal quality. There are no murmurs, rubs or gallops noted  CHEST:  Chest is clear to auscultation. Normal respiratory effort.  ABDOMEN:  Soft and nontender.   EXTREMITIES:  There is no edema.   SKIN:  No rash or significant lesions are noted.    I&O's Summary    I & Os for current day (as of 22 May 2017 08:54)  =============================================  IN: 360 ml / OUT: 0 ml / NET: 360 ml    LABS:                        12.6   6.8   )-----------( 177      ( 22 May 2017 06:20 )             39.1     05-22    144  |  111<H>  |  21  ----------------------------<  83  3.7   |  24  |  0.66    Ca    8.9      22 May 2017 06:20  Phos  3.6     05-22  Mg     2.0     05-22    EKG: Afib with RVR at 127.    TELEMETRY: Afib 70-80s    CARDIAC TESTS: pending    RADIOLOGY & ADDITIONAL STUDIES: pending    ASSESSMENT & PLAN:

## 2017-05-22 NOTE — PROGRESS NOTE ADULT - ASSESSMENT
89F with above hx p/w afib with RVR.    1. Afib- HR now rate controlled. Cont Bbl BID. Would avoid MILEY in elderly patient- especially with rate controlled, asymptomatic afib. Will continue rate control strategy.     2. Anticoagulation- will change to Eliquis 2.5mg BID. Pt has no recent falls or bleeding problems.     3. HTN- cont current meds.     4. Replete lytes as needed.    5. Dispo- case d/w hospitalist and daughter. D/C planning with outpt f/u.

## 2017-05-22 NOTE — DISCHARGE NOTE ADULT - NS AS DC STROKE ED MATERIALS
Call 911 for Stroke/Need for Followup After Discharge/Stroke Education Booklet/Stroke Warning Signs and Symptoms/Risk Factors for Stroke/Prescribed Medications

## 2017-05-22 NOTE — PROGRESS NOTE ADULT - SUBJECTIVE AND OBJECTIVE BOX
HPI:  89y female w/ PMH HTN, short term memory loss who has been living at [facility] since Sept 2016.  Pt went to see the physician at [facility], Dr. Montilla, for routine eval and it was noted that she had  tachycardia at 120bpm.  Pt was seen by her cardiologist,  Dr. Casanova who found the pt to be in new A-fib.  Dr. Casanova referred the pt to the ED for admission for Afib and possible cardioversion next week on Monday.  Pt's daughter has noticed that the pt has been more fatigued than usual over the last 3 weeks.  No cpain/SOB/palpitations/dizziness.  No n/v/f.    5/22- chart reviewed.  Pt     Vital Signs Last 24 Hrs  T(C): 36.4, Max: 36.4 (05-21 @ 16:52)  T(F): 97.5, Max: 97.5 (05-21 @ 16:52)  HR: 82 (82 - 89)  BP: 135/79 (123/58 - 138/80)  BP(mean): --  RR: 16 (16 - 17)  SpO2: 97% (95% - 97%)      PHYSICAL EXAM:  General: NAD, comfortable  Neuro: AAOx3, no focal deficits  HEENT: NCAT, PERRLA, EOMI,   Neck: Soft and supple, No JVD  Respiratory: CTA b/l, no w/r/r  Cardiovascular: S1 and S2, RRR, no m/g/r  Gastrointestinal: +BS, soft, NTND, no rebound/guarding  Extremities: No c/c/e  Vascular: 2+ peripheral pulses  Musculoskeletal: 5/5 strength b/l UE and LE, sensation intact  Skin: No rashes        LABS: All Labs Reviewed:                        12.6   6.8   )-----------( 177      ( 22 May 2017 06:20 )             39.1     05-22    144  |  111<H>  |  21  ----------------------------<  83  3.7   |  24  |  0.66    Ca    8.9      22 May 2017 06:20  Phos  3.6     05-22  Mg     2.0     05-22      CARDIAC MARKERS ( 19 May 2017 20:14 )  <0.015 ng/mL / x     / x     / x     / x      CARDIAC MARKERS ( 19 May 2017 16:22 )  <0.015 ng/mL / x     / x     / x     / x        MEDICATIONS  (STANDING):  rivaroxaban 20milliGRAM(s) Oral daily  DULoxetine 60milliGRAM(s) Oral daily  donepezil 5milliGRAM(s) Oral at bedtime  docusate sodium 100milliGRAM(s) Oral daily  multivitamin 1Tablet(s) Oral daily  memantine 10milliGRAM(s) Oral at bedtime  metoprolol 25milliGRAM(s) Oral two times a day  lactated ringers. 1000milliLiter(s) IV Continuous <Continuous>    MEDICATIONS  (PRN):  acetaminophen   Tablet. 650milliGRAM(s) Oral every 6 hours PRN Mild Pain (1 - 3)  oxyCODONE  5 mG/acetaminophen 325 mG 0.5Tablet(s) Oral every 6 hours PRN Moderate Pain (4 - 6)        Assessment and Plan:   89y female admitted w/    *new onset rapid Afib   - rate now controlled on BB  - discussed w/ Cardio- no plan for DCCV  - change AC to eliquis for discharge   - follow up in office    *HTN  - controlled    *dementia  - namenda, aricept    *dvt px     ambulate prior to d/c home.      Time to d/c >55 min. HPI:  89y female w/ PMH HTN, short term memory loss who has been living at [facility] since Sept 2016.  Pt went to see the physician at [facility], Dr. Montilla, for routine eval and it was noted that she had  tachycardia at 120bpm.  Pt was seen by her cardiologist,  Dr. Casanova who found the pt to be in new A-fib.  Dr. Casanova referred the pt to the ED for admission for Afib and possible cardioversion next week on Monday.  Pt's daughter has noticed that the pt has been more fatigued than usual over the last 3 weeks.  No cpain/SOB/palpitations/dizziness.  No n/v/f.    5/22- chart reviewed.  Pt w/o complaints. Pleasantly confused.  Discussed plan w/ Cardio and daughter.  Pt ambulated w/ walker yesterday.      Vital Signs Last 24 Hrs  T(C): 36.4, Max: 36.4 (05-21 @ 16:52)  T(F): 97.5, Max: 97.5 (05-21 @ 16:52)  HR: 82 (82 - 89)  BP: 135/79 (123/58 - 138/80)  BP(mean): --  RR: 16 (16 - 17)  SpO2: 97% (95% - 97%)      PHYSICAL EXAM:  General: NAD, comfortable  Neuro: pleasantly confused, no focal deficits  HEENT: NCAT, EOMI,   Neck: Soft and supple, No JVD  Respiratory: CTA b/l, no w/r/r  Cardiovascular: S1 and S2, irregularly irregular, no m/g/r  Gastrointestinal: +BS, soft, NTND, no rebound/guarding  Extremities: No c/c/e  Vascular: 2+ peripheral pulses  Musculoskeletal: 5/5 strength b/l UE and LE, sensation intact  Skin: No rashes        LABS: All Labs Reviewed:                        12.6   6.8   )-----------( 177      ( 22 May 2017 06:20 )             39.1     05-22    144  |  111<H>  |  21  ----------------------------<  83  3.7   |  24  |  0.66    Ca    8.9      22 May 2017 06:20  Phos  3.6     05-22  Mg     2.0     05-22      CARDIAC MARKERS ( 19 May 2017 20:14 )  <0.015 ng/mL / x     / x     / x     / x      CARDIAC MARKERS ( 19 May 2017 16:22 )  <0.015 ng/mL / x     / x     / x     / x        MEDICATIONS  (STANDING):  rivaroxaban 20milliGRAM(s) Oral daily  DULoxetine 60milliGRAM(s) Oral daily  donepezil 5milliGRAM(s) Oral at bedtime  docusate sodium 100milliGRAM(s) Oral daily  multivitamin 1Tablet(s) Oral daily  memantine 10milliGRAM(s) Oral at bedtime  metoprolol 25milliGRAM(s) Oral two times a day  lactated ringers. 1000milliLiter(s) IV Continuous <Continuous>    MEDICATIONS  (PRN):  acetaminophen   Tablet. 650milliGRAM(s) Oral every 6 hours PRN Mild Pain (1 - 3)  oxyCODONE  5 mG/acetaminophen 325 mG 0.5Tablet(s) Oral every 6 hours PRN Moderate Pain (4 - 6)        Assessment and Plan:   89y female admitted w/    *new onset rapid Afib   - rate now controlled on BB  - discussed w/ Cardio- no plan for DCCV  - change AC to eliquis for discharge   - follow up in office    *HTN  - controlled    *dementia  - namenda, aricept    *dvt px       Time to d/c >55 min.

## 2017-05-22 NOTE — DISCHARGE NOTE ADULT - PATIENT PORTAL LINK FT
“You can access the Follow[employer] Patient Portal, offered by [facility], by registering with the following website: [email or web address]”

## 2017-05-22 NOTE — DISCHARGE NOTE ADULT - MEDICATION SUMMARY - MEDICATIONS TO TAKE
I will START or STAY ON the medications listed below when I get home from the hospital:    lisinopril 5 mg oral tablet  -- 1 tab(s) by mouth once a day  -- Indication: For home med    Eliquis 2.5 mg oral tablet  -- 1 tab(s) by mouth 2 times a day  -- Check with your doctor before becoming pregnant.  It is very important that you take or use this exactly as directed.  Do not skip doses or discontinue unless directed by your doctor.  Obtain medical advice before taking any non-prescription drugs as some may affect the action of this medication.    -- Indication: For New med     Cymbalta 60 mg oral delayed release capsule  -- 1 cap(s) by mouth once a day  -- Indication: For home med    metoprolol tartrate 25 mg oral tablet  -- 1 tab(s) by mouth 2 times a day  -- Indication: For New med Afib    Aricept 5 mg oral tablet  -- 1 tab(s) by mouth once a day (at bedtime)  -- Indication: For home med    Colace  --  by mouth   -- Indication: For home med    Namenda XR 14 mg oral capsule, extended release  --  by mouth   -- Indication: For home med I will START or STAY ON the medications listed below when I get home from the hospital:    acetaminophen-oxycodone 325 mg-5 mg oral tablet  -- 0.5 tab(s) by mouth every 6 hours, As needed, Moderate Pain (4 - 6)  -- Indication: For home med    acetaminophen 325 mg oral tablet  -- 2 tab(s) by mouth every 6 hours, As needed, Mild Pain (1 - 3)  -- Indication: For home med    lisinopril 5 mg oral tablet  -- 1 tab(s) by mouth once a day  -- Indication: For home med    Eliquis 2.5 mg oral tablet  -- 1 tab(s) by mouth 2 times a day  -- Check with your doctor before becoming pregnant.  It is very important that you take or use this exactly as directed.  Do not skip doses or discontinue unless directed by your doctor.  Obtain medical advice before taking any non-prescription drugs as some may affect the action of this medication.    -- Indication: For New med     Cymbalta 60 mg oral delayed release capsule  -- 1 cap(s) by mouth once a day  -- Indication: For home med    metoprolol tartrate 25 mg oral tablet  -- 1 tab(s) by mouth 2 times a day  -- Indication: For New med Afib    Aricept 5 mg oral tablet  -- 1 tab(s) by mouth once a day (at bedtime)  -- Indication: For home med    Colace  --  by mouth   -- Indication: For home med    Namenda XR 14 mg oral capsule, extended release  --  by mouth   -- Indication: For home med    Multiple Vitamins oral tablet  -- 1 tab(s) by mouth once a day  -- Indication: For home med

## 2017-05-22 NOTE — DISCHARGE NOTE ADULT - CARE PROVIDER_API CALL
Hernando Casanova (DO), Cardiology; Internal Medicine  [address] [place] [address]  Phone: [phone]  Fax: [phone]

## 2017-05-25 DIAGNOSIS — I48.91 UNSPECIFIED ATRIAL FIBRILLATION: ICD-10-CM

## 2017-05-25 DIAGNOSIS — I10 ESSENTIAL (PRIMARY) HYPERTENSION: ICD-10-CM

## 2017-05-25 DIAGNOSIS — M54.5 LOW BACK PAIN: ICD-10-CM

## 2017-05-25 DIAGNOSIS — R41.3 OTHER AMNESIA: ICD-10-CM

## 2017-05-25 DIAGNOSIS — Z79.899 OTHER LONG TERM (CURRENT) DRUG THERAPY: ICD-10-CM

## 2017-05-25 DIAGNOSIS — F03.90 UNSPECIFIED DEMENTIA, UNSPECIFIED SEVERITY, WITHOUT BEHAVIORAL DISTURBANCE, PSYCHOTIC DISTURBANCE, MOOD DISTURBANCE, AND ANXIETY: ICD-10-CM

## 2017-10-10 ENCOUNTER — INPATIENT (INPATIENT)
Facility: HOSPITAL | Age: 82
LOS: 5 days | Discharge: HOME | End: 2017-10-16
Attending: INTERNAL MEDICINE | Admitting: INTERNAL MEDICINE
Payer: MEDICARE

## 2017-10-10 VITALS
SYSTOLIC BLOOD PRESSURE: 139 MMHG | OXYGEN SATURATION: 95 % | HEART RATE: 125 BPM | WEIGHT: 136.91 LBS | TEMPERATURE: 99 F | HEIGHT: 60 IN | RESPIRATION RATE: 20 BRPM | DIASTOLIC BLOOD PRESSURE: 67 MMHG

## 2017-10-10 LAB
ALBUMIN SERPL ELPH-MCNC: 3.2 G/DL — LOW (ref 3.3–5)
ALP SERPL-CCNC: 78 U/L — SIGNIFICANT CHANGE UP (ref 40–120)
ALT FLD-CCNC: 25 U/L — SIGNIFICANT CHANGE UP (ref 12–78)
ANION GAP SERPL CALC-SCNC: 8 MMOL/L — SIGNIFICANT CHANGE UP (ref 5–17)
APPEARANCE UR: CLEAR — SIGNIFICANT CHANGE UP
APTT BLD: 33.2 SEC — SIGNIFICANT CHANGE UP (ref 27.5–37.4)
AST SERPL-CCNC: 23 U/L — SIGNIFICANT CHANGE UP (ref 15–37)
BACTERIA # UR AUTO: (no result)
BASOPHILS # BLD AUTO: 0.1 K/UL — SIGNIFICANT CHANGE UP (ref 0–0.2)
BASOPHILS NFR BLD AUTO: 0.6 % — SIGNIFICANT CHANGE UP (ref 0–2)
BILIRUB SERPL-MCNC: 1.1 MG/DL — SIGNIFICANT CHANGE UP (ref 0.2–1.2)
BILIRUB UR-MCNC: NEGATIVE — SIGNIFICANT CHANGE UP
BUN SERPL-MCNC: 16 MG/DL — SIGNIFICANT CHANGE UP (ref 7–23)
CALCIUM SERPL-MCNC: 8.9 MG/DL — SIGNIFICANT CHANGE UP (ref 8.5–10.1)
CHLORIDE SERPL-SCNC: 101 MMOL/L — SIGNIFICANT CHANGE UP (ref 96–108)
CO2 SERPL-SCNC: 26 MMOL/L — SIGNIFICANT CHANGE UP (ref 22–31)
COLOR SPEC: YELLOW — SIGNIFICANT CHANGE UP
CREAT SERPL-MCNC: 0.84 MG/DL — SIGNIFICANT CHANGE UP (ref 0.5–1.3)
DIFF PNL FLD: (no result)
EOSINOPHIL # BLD AUTO: 0 K/UL — SIGNIFICANT CHANGE UP (ref 0–0.5)
EOSINOPHIL NFR BLD AUTO: 0 % — SIGNIFICANT CHANGE UP (ref 0–6)
EPI CELLS # UR: SIGNIFICANT CHANGE UP
GLUCOSE SERPL-MCNC: 101 MG/DL — HIGH (ref 70–99)
GLUCOSE UR QL: NEGATIVE MG/DL — SIGNIFICANT CHANGE UP
HCT VFR BLD CALC: 36.2 % — SIGNIFICANT CHANGE UP (ref 34.5–45)
HGB BLD-MCNC: 12.6 G/DL — SIGNIFICANT CHANGE UP (ref 11.5–15.5)
INR BLD: 1.46 RATIO — HIGH (ref 0.88–1.16)
KETONES UR-MCNC: (no result)
LACTATE SERPL-SCNC: 1.2 MMOL/L — SIGNIFICANT CHANGE UP (ref 0.7–2)
LEUKOCYTE ESTERASE UR-ACNC: (no result)
LYMPHOCYTES # BLD AUTO: 1.4 K/UL — SIGNIFICANT CHANGE UP (ref 1–3.3)
LYMPHOCYTES # BLD AUTO: 9 % — LOW (ref 13–44)
MCHC RBC-ENTMCNC: 32.5 PG — SIGNIFICANT CHANGE UP (ref 27–34)
MCHC RBC-ENTMCNC: 34.8 GM/DL — SIGNIFICANT CHANGE UP (ref 32–36)
MCV RBC AUTO: 93.3 FL — SIGNIFICANT CHANGE UP (ref 80–100)
MONOCYTES # BLD AUTO: 1.3 K/UL — HIGH (ref 0–0.9)
MONOCYTES NFR BLD AUTO: 8.6 % — SIGNIFICANT CHANGE UP (ref 2–14)
NEUTROPHILS # BLD AUTO: 12.7 K/UL — HIGH (ref 1.8–7.4)
NEUTROPHILS NFR BLD AUTO: 81.8 % — HIGH (ref 43–77)
NITRITE UR-MCNC: NEGATIVE — SIGNIFICANT CHANGE UP
PH UR: 6 — SIGNIFICANT CHANGE UP (ref 5–8)
PLATELET # BLD AUTO: 179 K/UL — SIGNIFICANT CHANGE UP (ref 150–400)
POTASSIUM SERPL-MCNC: 3.8 MMOL/L — SIGNIFICANT CHANGE UP (ref 3.5–5.3)
POTASSIUM SERPL-SCNC: 3.8 MMOL/L — SIGNIFICANT CHANGE UP (ref 3.5–5.3)
PROT SERPL-MCNC: 6.7 GM/DL — SIGNIFICANT CHANGE UP (ref 6–8.3)
PROT UR-MCNC: 30 MG/DL
PROTHROM AB SERPL-ACNC: 15.9 SEC — HIGH (ref 9.8–12.7)
RBC # BLD: 3.89 M/UL — SIGNIFICANT CHANGE UP (ref 3.8–5.2)
RBC # FLD: 13.9 % — SIGNIFICANT CHANGE UP (ref 10.3–14.5)
RBC CASTS # UR COMP ASSIST: (no result) /HPF (ref 0–4)
SODIUM SERPL-SCNC: 135 MMOL/L — SIGNIFICANT CHANGE UP (ref 135–145)
SP GR SPEC: 1.02 — SIGNIFICANT CHANGE UP (ref 1.01–1.02)
UROBILINOGEN FLD QL: 4 MG/DL
WBC # BLD: 15.6 K/UL — HIGH (ref 3.8–10.5)
WBC # FLD AUTO: 15.6 K/UL — HIGH (ref 3.8–10.5)
WBC UR QL: SIGNIFICANT CHANGE UP

## 2017-10-10 PROCEDURE — 99285 EMERGENCY DEPT VISIT HI MDM: CPT

## 2017-10-10 PROCEDURE — 71010: CPT | Mod: 26

## 2017-10-10 PROCEDURE — 93010 ELECTROCARDIOGRAM REPORT: CPT

## 2017-10-10 RX ORDER — SODIUM CHLORIDE 9 MG/ML
500 INJECTION INTRAMUSCULAR; INTRAVENOUS; SUBCUTANEOUS
Qty: 0 | Refills: 0 | Status: COMPLETED | OUTPATIENT
Start: 2017-10-10 | End: 2017-10-11

## 2017-10-10 RX ORDER — ACETAMINOPHEN 500 MG
650 TABLET ORAL ONCE
Qty: 0 | Refills: 0 | Status: COMPLETED | OUTPATIENT
Start: 2017-10-10 | End: 2017-10-10

## 2017-10-10 RX ORDER — CEFEPIME 1 G/1
2000 INJECTION, POWDER, FOR SOLUTION INTRAMUSCULAR; INTRAVENOUS ONCE
Qty: 0 | Refills: 0 | Status: COMPLETED | OUTPATIENT
Start: 2017-10-10 | End: 2017-10-10

## 2017-10-10 RX ORDER — SODIUM CHLORIDE 9 MG/ML
3 INJECTION INTRAMUSCULAR; INTRAVENOUS; SUBCUTANEOUS ONCE
Qty: 0 | Refills: 0 | Status: COMPLETED | OUTPATIENT
Start: 2017-10-10 | End: 2017-10-10

## 2017-10-10 RX ORDER — VANCOMYCIN HCL 1 G
1500 VIAL (EA) INTRAVENOUS ONCE
Qty: 0 | Refills: 0 | Status: COMPLETED | OUTPATIENT
Start: 2017-10-10 | End: 2017-10-10

## 2017-10-10 RX ADMIN — SODIUM CHLORIDE 3 MILLILITER(S): 9 INJECTION INTRAMUSCULAR; INTRAVENOUS; SUBCUTANEOUS at 23:31

## 2017-10-10 RX ADMIN — CEFEPIME 100 MILLIGRAM(S): 1 INJECTION, POWDER, FOR SOLUTION INTRAMUSCULAR; INTRAVENOUS at 23:45

## 2017-10-10 RX ADMIN — SODIUM CHLORIDE 2000 MILLILITER(S): 9 INJECTION INTRAMUSCULAR; INTRAVENOUS; SUBCUTANEOUS at 22:25

## 2017-10-10 RX ADMIN — SODIUM CHLORIDE 2000 MILLILITER(S): 9 INJECTION INTRAMUSCULAR; INTRAVENOUS; SUBCUTANEOUS at 22:40

## 2017-10-10 RX ADMIN — Medication 650 MILLIGRAM(S): at 22:25

## 2017-10-11 LAB
ABO RH CONFIRMATION: SIGNIFICANT CHANGE UP
ANION GAP SERPL CALC-SCNC: 8 MMOL/L — SIGNIFICANT CHANGE UP (ref 5–17)
BASOPHILS # BLD AUTO: 0.1 K/UL — SIGNIFICANT CHANGE UP (ref 0–0.2)
BASOPHILS NFR BLD AUTO: 1 % — SIGNIFICANT CHANGE UP (ref 0–2)
BLD GP AB SCN SERPL QL: SIGNIFICANT CHANGE UP
BUN SERPL-MCNC: 14 MG/DL — SIGNIFICANT CHANGE UP (ref 7–23)
CALCIUM SERPL-MCNC: 8.7 MG/DL — SIGNIFICANT CHANGE UP (ref 8.5–10.1)
CHLORIDE SERPL-SCNC: 105 MMOL/L — SIGNIFICANT CHANGE UP (ref 96–108)
CHOLEST SERPL-MCNC: 144 MG/DL — SIGNIFICANT CHANGE UP (ref 10–199)
CO2 SERPL-SCNC: 24 MMOL/L — SIGNIFICANT CHANGE UP (ref 22–31)
CREAT SERPL-MCNC: 0.74 MG/DL — SIGNIFICANT CHANGE UP (ref 0.5–1.3)
EOSINOPHIL # BLD AUTO: 0 K/UL — SIGNIFICANT CHANGE UP (ref 0–0.5)
EOSINOPHIL NFR BLD AUTO: 0.4 % — SIGNIFICANT CHANGE UP (ref 0–6)
GLUCOSE SERPL-MCNC: 110 MG/DL — HIGH (ref 70–99)
HCT VFR BLD CALC: 34.4 % — LOW (ref 34.5–45)
HDLC SERPL-MCNC: 49 MG/DL — SIGNIFICANT CHANGE UP (ref 40–125)
HGB BLD-MCNC: 11.3 G/DL — LOW (ref 11.5–15.5)
LIPID PNL WITH DIRECT LDL SERPL: 77 MG/DL — SIGNIFICANT CHANGE UP
LYMPHOCYTES # BLD AUTO: 1.7 K/UL — SIGNIFICANT CHANGE UP (ref 1–3.3)
LYMPHOCYTES # BLD AUTO: 16 % — SIGNIFICANT CHANGE UP (ref 13–44)
MCHC RBC-ENTMCNC: 31.5 PG — SIGNIFICANT CHANGE UP (ref 27–34)
MCHC RBC-ENTMCNC: 32.8 GM/DL — SIGNIFICANT CHANGE UP (ref 32–36)
MCV RBC AUTO: 96 FL — SIGNIFICANT CHANGE UP (ref 80–100)
MONOCYTES # BLD AUTO: 1.1 K/UL — HIGH (ref 0–0.9)
MONOCYTES NFR BLD AUTO: 10.5 % — SIGNIFICANT CHANGE UP (ref 2–14)
NEUTROPHILS # BLD AUTO: 7.6 K/UL — HIGH (ref 1.8–7.4)
NEUTROPHILS NFR BLD AUTO: 72.1 % — SIGNIFICANT CHANGE UP (ref 43–77)
PLATELET # BLD AUTO: 150 K/UL — SIGNIFICANT CHANGE UP (ref 150–400)
POTASSIUM SERPL-MCNC: 3.3 MMOL/L — LOW (ref 3.5–5.3)
POTASSIUM SERPL-SCNC: 3.3 MMOL/L — LOW (ref 3.5–5.3)
RAPID RVP RESULT: SIGNIFICANT CHANGE UP
RBC # BLD: 3.59 M/UL — LOW (ref 3.8–5.2)
RBC # FLD: 14.2 % — SIGNIFICANT CHANGE UP (ref 10.3–14.5)
SODIUM SERPL-SCNC: 137 MMOL/L — SIGNIFICANT CHANGE UP (ref 135–145)
TOTAL CHOLESTEROL/HDL RATIO MEASUREMENT: 2.9 RATIO — LOW (ref 3.3–7.1)
TRIGL SERPL-MCNC: 92 MG/DL — SIGNIFICANT CHANGE UP (ref 10–149)
TYPE + AB SCN PNL BLD: SIGNIFICANT CHANGE UP
WBC # BLD: 10.6 K/UL — HIGH (ref 3.8–10.5)
WBC # FLD AUTO: 10.6 K/UL — HIGH (ref 3.8–10.5)

## 2017-10-11 RX ORDER — DOCUSATE SODIUM 100 MG
100 CAPSULE ORAL AT BEDTIME
Qty: 0 | Refills: 0 | Status: DISCONTINUED | OUTPATIENT
Start: 2017-10-11 | End: 2017-10-16

## 2017-10-11 RX ORDER — ACETAMINOPHEN 500 MG
650 TABLET ORAL EVERY 6 HOURS
Qty: 0 | Refills: 0 | Status: DISCONTINUED | OUTPATIENT
Start: 2017-10-11 | End: 2017-10-16

## 2017-10-11 RX ORDER — AZITHROMYCIN 500 MG/1
500 TABLET, FILM COATED ORAL DAILY
Qty: 0 | Refills: 0 | Status: DISCONTINUED | OUTPATIENT
Start: 2017-10-11 | End: 2017-10-14

## 2017-10-11 RX ORDER — OXYCODONE AND ACETAMINOPHEN 5; 325 MG/1; MG/1
0.5 TABLET ORAL DAILY
Qty: 0 | Refills: 0 | Status: DISCONTINUED | OUTPATIENT
Start: 2017-10-11 | End: 2017-10-14

## 2017-10-11 RX ORDER — IBUPROFEN 200 MG
2 TABLET ORAL
Qty: 0 | Refills: 0 | COMMUNITY

## 2017-10-11 RX ORDER — DOCUSATE SODIUM 100 MG
0 CAPSULE ORAL
Qty: 0 | Refills: 0 | COMMUNITY

## 2017-10-11 RX ORDER — SODIUM CHLORIDE 9 MG/ML
1000 INJECTION INTRAMUSCULAR; INTRAVENOUS; SUBCUTANEOUS
Qty: 0 | Refills: 0 | Status: DISCONTINUED | OUTPATIENT
Start: 2017-10-11 | End: 2017-10-11

## 2017-10-11 RX ORDER — APIXABAN 2.5 MG/1
1 TABLET, FILM COATED ORAL
Qty: 0 | Refills: 0 | COMMUNITY

## 2017-10-11 RX ORDER — DONEPEZIL HYDROCHLORIDE 10 MG/1
1 TABLET, FILM COATED ORAL
Qty: 0 | Refills: 0 | COMMUNITY

## 2017-10-11 RX ORDER — MEMANTINE HYDROCHLORIDE 10 MG/1
0 TABLET ORAL
Qty: 0 | Refills: 0 | COMMUNITY

## 2017-10-11 RX ORDER — SENNA PLUS 8.6 MG/1
2 TABLET ORAL AT BEDTIME
Qty: 0 | Refills: 0 | Status: DISCONTINUED | OUTPATIENT
Start: 2017-10-11 | End: 2017-10-16

## 2017-10-11 RX ORDER — DOCUSATE SODIUM 100 MG
1 CAPSULE ORAL
Qty: 0 | Refills: 0 | COMMUNITY

## 2017-10-11 RX ORDER — DULOXETINE HYDROCHLORIDE 30 MG/1
60 CAPSULE, DELAYED RELEASE ORAL AT BEDTIME
Qty: 0 | Refills: 0 | Status: DISCONTINUED | OUTPATIENT
Start: 2017-10-11 | End: 2017-10-16

## 2017-10-11 RX ORDER — CHOLECALCIFEROL (VITAMIN D3) 125 MCG
1000 CAPSULE ORAL DAILY
Qty: 0 | Refills: 0 | Status: DISCONTINUED | OUTPATIENT
Start: 2017-10-11 | End: 2017-10-16

## 2017-10-11 RX ORDER — CEFEPIME 1 G/1
1000 INJECTION, POWDER, FOR SOLUTION INTRAMUSCULAR; INTRAVENOUS EVERY 12 HOURS
Qty: 0 | Refills: 0 | Status: DISCONTINUED | OUTPATIENT
Start: 2017-10-11 | End: 2017-10-14

## 2017-10-11 RX ORDER — POTASSIUM CHLORIDE 20 MEQ
40 PACKET (EA) ORAL ONCE
Qty: 0 | Refills: 0 | Status: COMPLETED | OUTPATIENT
Start: 2017-10-11 | End: 2017-10-11

## 2017-10-11 RX ORDER — METOPROLOL TARTRATE 50 MG
25 TABLET ORAL DAILY
Qty: 0 | Refills: 0 | Status: DISCONTINUED | OUTPATIENT
Start: 2017-10-11 | End: 2017-10-16

## 2017-10-11 RX ORDER — MEMANTINE HYDROCHLORIDE 10 MG/1
1 TABLET ORAL
Qty: 0 | Refills: 0 | COMMUNITY

## 2017-10-11 RX ORDER — METOPROLOL TARTRATE 50 MG
1 TABLET ORAL
Qty: 0 | Refills: 0 | COMMUNITY

## 2017-10-11 RX ORDER — LISINOPRIL 2.5 MG/1
1 TABLET ORAL
Qty: 0 | Refills: 0 | COMMUNITY

## 2017-10-11 RX ORDER — APIXABAN 2.5 MG/1
2.5 TABLET, FILM COATED ORAL EVERY 12 HOURS
Qty: 0 | Refills: 0 | Status: DISCONTINUED | OUTPATIENT
Start: 2017-10-11 | End: 2017-10-16

## 2017-10-11 RX ORDER — PREGABALIN 225 MG/1
1 CAPSULE ORAL
Qty: 0 | Refills: 0 | COMMUNITY

## 2017-10-11 RX ORDER — DONEPEZIL HYDROCHLORIDE 10 MG/1
5 TABLET, FILM COATED ORAL DAILY
Qty: 0 | Refills: 0 | Status: DISCONTINUED | OUTPATIENT
Start: 2017-10-11 | End: 2017-10-16

## 2017-10-11 RX ORDER — CHOLECALCIFEROL (VITAMIN D3) 125 MCG
1 CAPSULE ORAL
Qty: 0 | Refills: 0 | COMMUNITY

## 2017-10-11 RX ORDER — MEMANTINE HYDROCHLORIDE 10 MG/1
10 TABLET ORAL
Qty: 0 | Refills: 0 | Status: DISCONTINUED | OUTPATIENT
Start: 2017-10-11 | End: 2017-10-16

## 2017-10-11 RX ORDER — ONDANSETRON 8 MG/1
4 TABLET, FILM COATED ORAL EVERY 6 HOURS
Qty: 0 | Refills: 0 | Status: DISCONTINUED | OUTPATIENT
Start: 2017-10-11 | End: 2017-10-16

## 2017-10-11 RX ORDER — DULOXETINE HYDROCHLORIDE 30 MG/1
1 CAPSULE, DELAYED RELEASE ORAL
Qty: 0 | Refills: 0 | COMMUNITY

## 2017-10-11 RX ORDER — SIMVASTATIN 20 MG/1
40 TABLET, FILM COATED ORAL AT BEDTIME
Qty: 0 | Refills: 0 | Status: DISCONTINUED | OUTPATIENT
Start: 2017-10-11 | End: 2017-10-16

## 2017-10-11 RX ORDER — PREGABALIN 225 MG/1
1000 CAPSULE ORAL DAILY
Qty: 0 | Refills: 0 | Status: DISCONTINUED | OUTPATIENT
Start: 2017-10-11 | End: 2017-10-16

## 2017-10-11 RX ORDER — OXYCODONE AND ACETAMINOPHEN 5; 325 MG/1; MG/1
1 TABLET ORAL AT BEDTIME
Qty: 0 | Refills: 0 | Status: DISCONTINUED | OUTPATIENT
Start: 2017-10-11 | End: 2017-10-14

## 2017-10-11 RX ORDER — SIMVASTATIN 20 MG/1
1 TABLET, FILM COATED ORAL
Qty: 0 | Refills: 0 | COMMUNITY

## 2017-10-11 RX ADMIN — APIXABAN 2.5 MILLIGRAM(S): 2.5 TABLET, FILM COATED ORAL at 10:57

## 2017-10-11 RX ADMIN — AZITHROMYCIN 500 MILLIGRAM(S): 500 TABLET, FILM COATED ORAL at 12:09

## 2017-10-11 RX ADMIN — MEMANTINE HYDROCHLORIDE 10 MILLIGRAM(S): 10 TABLET ORAL at 10:58

## 2017-10-11 RX ADMIN — Medication 1000 UNIT(S): at 10:59

## 2017-10-11 RX ADMIN — PREGABALIN 1000 MICROGRAM(S): 225 CAPSULE ORAL at 11:03

## 2017-10-11 RX ADMIN — Medication 40 MILLIEQUIVALENT(S): at 17:03

## 2017-10-11 RX ADMIN — APIXABAN 2.5 MILLIGRAM(S): 2.5 TABLET, FILM COATED ORAL at 17:03

## 2017-10-11 RX ADMIN — DULOXETINE HYDROCHLORIDE 60 MILLIGRAM(S): 30 CAPSULE, DELAYED RELEASE ORAL at 21:51

## 2017-10-11 RX ADMIN — CEFEPIME 100 MILLIGRAM(S): 1 INJECTION, POWDER, FOR SOLUTION INTRAMUSCULAR; INTRAVENOUS at 17:05

## 2017-10-11 RX ADMIN — DONEPEZIL HYDROCHLORIDE 5 MILLIGRAM(S): 10 TABLET, FILM COATED ORAL at 11:03

## 2017-10-11 RX ADMIN — CEFEPIME 100 MILLIGRAM(S): 1 INJECTION, POWDER, FOR SOLUTION INTRAMUSCULAR; INTRAVENOUS at 12:09

## 2017-10-11 RX ADMIN — Medication 25 MILLIGRAM(S): at 10:58

## 2017-10-11 RX ADMIN — SIMVASTATIN 40 MILLIGRAM(S): 20 TABLET, FILM COATED ORAL at 21:51

## 2017-10-11 RX ADMIN — MEMANTINE HYDROCHLORIDE 10 MILLIGRAM(S): 10 TABLET ORAL at 17:03

## 2017-10-11 RX ADMIN — Medication 333.33 MILLIGRAM(S): at 00:23

## 2017-10-11 NOTE — CONSULT NOTE ADULT - ASSESSMENT
89 y/o female with h/o dementia, HTN, HLD, A.Fib, spinal stenosis, depression, anxiety disorder was admitted on 10/10 with shortness of breath for 1-2 days. Pt was sent from [facility] due to lethargy and recent URI symptoms. Pt denied any sore throat. Pt is poor historian due to dementia. In ER, she was noted febrile to 102.8F and received cefepime.    1. Febrile syndrome. Possible sepsis. Multifocal pneumonia. Possible aspiration pneumonia. Encephalopathy.  -leukocytosis  -obtain BC x 2  -agree with cefepime 1 gm IV q12h and azithromycin 500 mg PO qd  -reason for abx use and side effects reviewed with patient; monitor BMP  -aspiration precautions  -monitor temps  -f/u CBC  -supportive care  2. Other issues:   -care per medicine

## 2017-10-11 NOTE — SWALLOW BEDSIDE ASSESSMENT ADULT - SWALLOW EVAL: RECOMMENDED DIET
SUGGEST A REGULAR TEXTURE DIET WITH THIN LIQUID CONSISTENCIES AS SHE TOLERATES SAME FROM AN OROPHARYNGEAL SWALLOWING STANCE AND PO TEXTURES ON THIS DIET BEST ACCOMMODATE HER FOOD PREFERENCES.

## 2017-10-11 NOTE — ED PROVIDER NOTE - OBJECTIVE STATEMENT
89 y/o female presents to the ED c/o dyspnea and AMS today. Pt lives in a nursing home. Daughter visited pt over the weekend and noted pt to have URI sx. When visiting today, noted that pt was dyspneic and altered. +decreased appetite +weakness. Pt is a poor historian and hx obtained from daughter.

## 2017-10-11 NOTE — SWALLOW BEDSIDE ASSESSMENT ADULT - ASR SWALLOW LINGUAL MOBILITY
Effort was reduced when executing volitional lingual actions but tongue function was adequate for age without an overt lingual focality.

## 2017-10-11 NOTE — H&P ADULT - NSHPPHYSICALEXAM_GEN_ALL_CORE
Vital Signs Last 24 Hrs  T(C): 37.3 (11 Oct 2017 00:24), Max: 39.3 (10 Oct 2017 22:25)  T(F): 99.1 (11 Oct 2017 00:24), Max: 102.8 (10 Oct 2017 22:25)  HR: 105 (11 Oct 2017 00:55) (105 - 125)  BP: 111/63 (11 Oct 2017 00:55) (111/63 - 139/79)  BP(mean): --  RR: 20 (11 Oct 2017 00:55) (20 - 20)  SpO2: 97% (11 Oct 2017 00:55) (95% - 98%)    GEN: appears comfortable  Neuro: Alert, answers questions appropriately, CN II-XII grossly intact  HEENT: NC/AT, EOMI  Neck: no thyroidmegaly, no JVD  Cardiovascular: S1S2 present, regular rhythm, no murmur  Respiratory: breath sounds normal bilaterally, no wheezing, no rales, no rhonchi  Gastrointestinal: bowel sounds normal, soft, no abdominal tenderness  Musculoskeletal: no muscle tenderness  Extremities: No edema  Skin: No rash Vital Signs Last 24 Hrs  T(C): 37.3 (11 Oct 2017 00:24), Max: 39.3 (10 Oct 2017 22:25)  T(F): 99.1 (11 Oct 2017 00:24), Max: 102.8 (10 Oct 2017 22:25)  HR: 105 (11 Oct 2017 00:55) (105 - 125)  BP: 111/63 (11 Oct 2017 00:55) (111/63 - 139/79)  BP(mean): --  RR: 20 (11 Oct 2017 00:55) (20 - 20)  SpO2: 97% (11 Oct 2017 00:55) (95% - 98%)    GEN: appears comfortable  Neuro: Alert, answers questions appropriately, CN II-XII grossly intact  HEENT: NC/AT, EOMI  Neck: no thyroidmegaly, no JVD  Cardiovascular: S1S2 present, regular rhythm, no murmur  Respiratory: breath sounds normal bilaterally, no wheezing, no rales, +rhonchi  Gastrointestinal: bowel sounds normal, soft, no abdominal tenderness  Musculoskeletal: no muscle tenderness  Extremities: 2+ pitting edema bilaterally  Skin: No rash, cool extremity

## 2017-10-11 NOTE — SWALLOW BEDSIDE ASSESSMENT ADULT - ASR SWALLOW RECOMMEND DIAG
Defer MBS as pt appeared clinically tolerant of suggested PO textures from an oropharyngeal swallowing stance and she did NOT appear to be aspirating on clinical exam.

## 2017-10-11 NOTE — SWALLOW BEDSIDE ASSESSMENT ADULT - ORAL PHASE
Bolus formation/transfer were mildly prolonged but mechanically functional for age without evidence of an overt oral motor focality, Piecemeal deglutition was evident which is age expected but pt was able to spontaneously clear all oral debris on own.

## 2017-10-11 NOTE — ED ADULT NURSE NOTE - OBJECTIVE STATEMENT
89 y/o female awake alert and oriented x2 with hx of dementia presents to ED from [facility] c/o lethargic and SOB x couple of days. (+) cough. Denies cp,ha,dz,n/v/d/fever/chills or urinary sx. 89 y/o female awake alert and oriented x2 with hx of dementia presents to ED from [facility] c/o lethargic and SOB x couple of days. (+) cough. Denies cp,ha,dz,n/v/d/fever/chills or urinary sx. Temp 102.8 rectally noted in ED. Lethargy noted upon arrival.

## 2017-10-11 NOTE — SWALLOW BEDSIDE ASSESSMENT ADULT - SLP GENERAL OBSERVATIONS
Pt seen at bedside. On encounter, a moist non nutritive cough was noted but she was not in overt respiratory distress. Patient was alert, interactive and able to verbalize during communicative probes/in conversation. When verbalizing, her utterances were intelligible, fluent and linguistically intact. No overt Dysarthria, Verbal Apraxia, or Aphasia were evident on exam. Of note is that her utterances were noted to be brief at times and she seemed variably forgetful which I suspect is due to chronic cognitive limitations associated with known dementia. With that being stated, she was able to verbally make her needs known and is reportedly at communicative baseline. Note that when asked about swallowing, she denied need for diet texture modification and denied aspiration signs with meals. Pt seen at bedside. On encounter, a moist non nutritive cough was noted but she was not in overt respiratory distress. Patient was alert, interactive and able to verbalize during communicative probes/in conversation. When verbalizing, her utterances were intelligible, fluent and linguistically intact. No overt Dysarthria, Verbal Apraxia, or Aphasia were evident on exam. Of note is that her utterances tended to be brief at times and she seemed variably forgetful which I suspect is due to chronic cognitive limitations associated with known dementia. With that being stated, she was able to verbally make her needs known and is reportedly at communicative baseline. Note that when asked about swallowing, she denied need for diet texture modification and denied aspiration signs with meals.

## 2017-10-11 NOTE — H&P ADULT - ASSESSMENT
89 y.o. female with PMH dementia, HTN, HLD, AFib, spinal stenosis, depression, anxiety presents with shortness of breath. Pt was sent from [facility] due to lethargy and recent URI symptoms. Pt denies any sore throat. Denies CP, abd pain, dysuria, or diarrhea. Pt is poor historian due to dementia.    #sepsis (leukocytosis 15.6, fever 102.8, tachycardia 125) due to likely acute PNA  -admit to tele  -continuous pulse ox monitoring and supplemental oxygen  -RVP negative, UA negative for infection  -cont iv vanco, cefepime  -f/u cultures  -iv hydration  -ID consult    #AFib, HTN, HLD  -cont toprol xl, statin  -on eliquis    #dementia  -on namenda and donepezil    #depression  -duloxetine    #DVT ppx  -Eliquis

## 2017-10-11 NOTE — SWALLOW BEDSIDE ASSESSMENT ADULT - COMMENTS
Pt was admitted to  from [facility] with lethargy and SOB. Hosp course notable for letahrgy which is improving and multi focal pneumonia/sepsis. This profile is superimposed upon a prior history of Alzheimers Dementia, anxiety, depression, A-Fib, HTN, HLD, and spinal stenosis status post L3-L4 discectomy. Pt was admitted to  from [facility] with lethargy and SOB. Hosp course notable for lethargy which is improving and multi focal pneumonia/sepsis. This profile is superimposed upon a prior history of Alzheimers Dementia, anxiety, depression, A-Fib, HTN, HLD, and spinal stenosis status post L3-L4 discectomy. Pt was admitted to  from [facility] with lethargy and SOB. Hospital course notable for lethargy which is improving and multi focal pneumonia/sepsis. This profile is superimposed upon a prior history of Alzheimers Dementia, anxiety, depression, A-Fib, HTN, HLD, and spinal stenosis status post L3-L4 discectomy.

## 2017-10-11 NOTE — SWALLOW BEDSIDE ASSESSMENT ADULT - ASR SWALLOW LABIAL MOBILITY
Effort was reduced when executing volitional labial actions but lip function was adequate for age without an overt labial focality.

## 2017-10-11 NOTE — SWALLOW BEDSIDE ASSESSMENT ADULT - PHARYNGEAL PHASE
Swallow was triggered in an acceptable time frame for age. Laryngeal lift on palpation during swallow trials was slightly reduced but considered to be functional and within age acceptable parameters. No behavioral aspiration signs exhibited on exam. Odynophagia was denied.

## 2017-10-11 NOTE — SWALLOW BEDSIDE ASSESSMENT ADULT - SWALLOW EVAL: SECRETION MANAGEMENT
Grossly adequate for age. Note that no drooling was noted and strength of cough was grossly adequate albeit somewhat moist.

## 2017-10-11 NOTE — PROGRESS NOTE ADULT - SUBJECTIVE AND OBJECTIVE BOX
CC:  SOB.  lethargy.    HPI:  89F.  presents to ED w/ SOB.  [facility] resident ([place]).  lethargy.  recent URI.    PMHx:  dementia;  HTN;  hyperlipidemia;  AF (E);  spinal stenosis;  depression/anxiety.    10/11- CC:  SOB.  lethargy.    HPI:  89F.  presents to ED w/ SOB.  [facility] resident ([place]).  lethargy.  recent URI.    PMHx:  dementia;  HTN;  hyperlipidemia;  AF (E);  spinal stenosis;  depression/anxiety.    10/11-family at bedside.  patient reports feeling somewhat better.    ROS:  (+) cough.    ill appearing elderly wf.  NCAT.  neck supple.  lungs (+) rales, rhonchi.  diminished breath sounds.  heart RRR.  NS1S2.  no MRG.  abd soft.  NT.  ND.  nml BS.  ext no edema.  neuro confused.    MEDICATIONS  (STANDING):  apixaban 2.5 milliGRAM(s) Oral every 12 hours  azithromycin   Tablet 500 milliGRAM(s) Oral daily  cefepime  IVPB 1000 milliGRAM(s) IV Intermittent every 12 hours  cholecalciferol 1000 Unit(s) Oral daily  cyanocobalamin 1000 MICROGram(s) Oral daily  docusate sodium 100 milliGRAM(s) Oral at bedtime  donepezil 5 milliGRAM(s) Oral daily  DULoxetine 60 milliGRAM(s) Oral at bedtime  memantine 10 milliGRAM(s) Oral two times a day  metoprolol succinate ER 25 milliGRAM(s) Oral daily  oxyCODONE    5 mG/acetaminophen 325 mG 1 Tablet(s) Oral at bedtime  potassium chloride    Tablet ER 40 milliEquivalent(s) Oral once  simvastatin 40 milliGRAM(s) Oral at bedtime    MEDICATIONS  (PRN):  acetaminophen   Tablet 650 milliGRAM(s) Oral every 6 hours PRN For Temp greater than 38 C (100.4 F)  ondansetron Injectable 4 milliGRAM(s) IV Push every 6 hours PRN Nausea  oxyCODONE    5 mG/acetaminophen 325 mG 0.5 Tablet(s) Oral daily PRN Moderate Pain (4 - 6)  senna 2 Tablet(s) Oral at bedtime PRN Constipation    Vital Signs Last 24 Hrs  T(C): 36.7 (11 Oct 2017 10:09), Max: 39.3 (10 Oct 2017 22:25)  T(F): 98.1 (11 Oct 2017 10:09), Max: 102.8 (10 Oct 2017 22:25)  HR: 105 (11 Oct 2017 10:09) (92 - 125)  BP: 109/69 (11 Oct 2017 10:09) (105/60 - 139/79)  BP(mean): --  RR: 18 (11 Oct 2017 10:09) (18 - 20)  SpO2: 99% (11 Oct 2017 10:09) (95% - 99%)                        11.3   10.6  )-----------( 150      ( 11 Oct 2017 09:53 )             34.4     PT/INR - ( 10 Oct 2017 22:45 )   PT: 15.9 sec;   INR: 1.46 ratio         PTT - ( 10 Oct 2017 22:45 )  PTT:33.2 sec  10-11    137  |  105  |  14  ----------------------------<  110<H>  3.3<L>   |  24  |  0.74    Ca    8.7      11 Oct 2017 09:53    TPro  6.7  /  Alb  3.2<L>  /  TBili  1.1  /  DBili  x   /  AST  23  /  ALT  25  /  AlkPhos  78  10-10    multifocal pneumonia w/ sepsis upon admission.  resistant and gram negative organisms are possible.  -f/u blood cultures.  -supplemental O2 via NC as needed.  -c/w cefepime + azithromycin.  -ID following.  -speech pathology consult.  will change to mechanical soft diet w/ thin liquids pending evaluation.  -d/w family.

## 2017-10-11 NOTE — ED ADULT NURSE REASSESSMENT NOTE - NS ED NURSE REASSESS COMMENT FT1
Report given to Hattie LECHUGA on 3N. Pending transport. Pt turned, cleaned and new sheets provided for comfort. Will cont to monitor for safety and comfort.

## 2017-10-11 NOTE — SWALLOW BEDSIDE ASSESSMENT ADULT - SWALLOW EVAL: CRITERIA FOR SKILLED INTERVENTION MET
Acute speech path intervention is not clinically warranted and would not . Note that Presbyphagia is a pre-existing stable condition and cognitive deficits are also pre-existing/chronic. Moreover, she is able to verbalize her intents and is reportedly at communicative baseline. Thus, WILL NOT ACTIVELY FOLLOW. RECONSULT PRN AS NEEDED.

## 2017-10-11 NOTE — SWALLOW BEDSIDE ASSESSMENT ADULT - SWALLOW EVAL: DIAGNOSIS
1) Patient demonstrates mild functional Oropharyngeal Presbyphagia with sufficient oropharyngeal swallowing preservation for age(89) nonetheless. Note that while Presbyphagia may place patient at a relatively heightened risk for episodic aspiration, there were no behavioral aspiration signs exhibited on exam.   2) Patient was alert, interactive and able to verbalize during communicative probes/in conversation. When verbalizing, her utterances were intelligible, fluent and linguistically intact. No overt Dysarthria, Verbal Apraxia, or Aphasia were evident on exam. Of note is that her utterances were noted to be brief at times and she seemed variably forgetful which I suspect is due to chronic cognitive limitations associated with known dementia. With that being stated, she was able to verbally make her needs known and is reportedly at communicative baseline.

## 2017-10-11 NOTE — ED ADULT NURSE REASSESSMENT NOTE - NS ED NURSE REASSESS COMMENT FT1
pt became more awake alert and oriented than upon arrival. Improved mental status noted. Pt sleeping with no acute distress noted. Daughter at bedside. Pt afebrile. Will cont to monitor for safety and comfort.

## 2017-10-11 NOTE — ED ADULT NURSE REASSESSMENT NOTE - NS ED NURSE REASSESS COMMENT FT1
pt resting comfortably in bed with no acute distress noted. Vitals stable. Awaiting for hospitalist. Will cont to monitor for safety and comfort.

## 2017-10-11 NOTE — H&P ADULT - HISTORY OF PRESENT ILLNESS
89 y.o. female with PMH dementia, HTN, HLD, AFib, spinal stenosis, depression, anxiety presents with shortness of breath. Pt was sent from [facility] due to lethargy and recent URI symptoms. Pt denies any sore throat. Denies CP, abd pain, dysuria, or diarrhea. Pt is poor historian due to dementia.    PMH: as above  PSH: L3-L4 discectomy spinal surgery, hernia repair  Social Hx: no tobacco/EtOH/drugs  Family Hx: noncontrib to current presentation  ROS: poor historian due to dementia

## 2017-10-11 NOTE — CONSULT NOTE ADULT - SUBJECTIVE AND OBJECTIVE BOX
Patient is a 89y old  Female who presents with a chief complaint of SOB, lethargy (11 Oct 2017 03:37)    HPI:  89 y/o female with h/o dementia, HTN, HLD, A.Fib, spinal stenosis, depression, anxiety disorder was admitted on 10/10 with shortness of breath for 1-2 days. Pt was sent from [facility] due to lethargy and recent URI symptoms. Pt denied any sore throat. Pt is poor historian due to dementia. In ER, she was noted febrile to 102.8F and received cefepime.    PMH: as above  PSH: L3-L4 discectomy spinal surgery, hernia repair    Meds: per reconciliation sheet, noted below  MEDICATIONS  (STANDING):  apixaban 2.5 milliGRAM(s) Oral every 12 hours  cefepime  IVPB 1000 milliGRAM(s) IV Intermittent every 12 hours  cholecalciferol 1000 Unit(s) Oral daily  cyanocobalamin 1000 MICROGram(s) Oral daily  docusate sodium 100 milliGRAM(s) Oral at bedtime  donepezil 5 milliGRAM(s) Oral daily  DULoxetine 60 milliGRAM(s) Oral at bedtime  memantine 10 milliGRAM(s) Oral two times a day  metoprolol succinate ER 25 milliGRAM(s) Oral daily  oxyCODONE    5 mG/acetaminophen 325 mG 1 Tablet(s) Oral at bedtime  simvastatin 40 milliGRAM(s) Oral at bedtime  sodium chloride 0.9%. 1000 milliLiter(s) (75 mL/Hr) IV Continuous <Continuous>    MEDICATIONS  (PRN):  acetaminophen   Tablet 650 milliGRAM(s) Oral every 6 hours PRN For Temp greater than 38 C (100.4 F)  ondansetron Injectable 4 milliGRAM(s) IV Push every 6 hours PRN Nausea  oxyCODONE    5 mG/acetaminophen 325 mG 0.5 Tablet(s) Oral daily PRN Moderate Pain (4 - 6)  senna 2 Tablet(s) Oral at bedtime PRN Constipation    Allergies    No Known Allergies    Intolerances      Social: no smoking, no alcohol, no illegal drugs; no recent travel, no exposure to TB  FAMILY HISTORY:    ROS: the patient dis confused; unobtainable    Vital Signs Last 24 Hrs  T(C): 36.3 (11 Oct 2017 06:00), Max: 39.3 (10 Oct 2017 22:25)  T(F): 97.3 (11 Oct 2017 06:00), Max: 102.8 (10 Oct 2017 22:25)  HR: 95 (11 Oct 2017 06:00) (92 - 125)  BP: 132/73 (11 Oct 2017 06:00) (105/60 - 139/79)  BP(mean): --  RR: 19 (11 Oct 2017 06:00) (18 - 20)  SpO2: 99% (11 Oct 2017 06:00) (95% - 99%)  Daily Height in cm: 152.4 (10 Oct 2017 22:10)    Daily Weight in k.8 (11 Oct 2017 06:00)    PE:    Constitutional: frail looking  HEENT: NC/AT, EOMI, PERRLA  Neck: supple  Back: no tenderness  Respiratory: crackles at bases  Cardiovascular: S1S2 regular, no murmurs  Abdomen: soft, not tender, not distended, positive BS  Genitourinary: no LN palpable  Rectal: deferred  Musculoskeletal: no muscle tenderness, no joint swelling or tenderness  Extremities: no pedal edema  Neurological: confused, moving all extremities  Skin: no rashes    Labs:                        12.6   15.6  )-----------( 179      ( 10 Oct 2017 22:45 )             36.2     10-10    135  |  101  |  16  ----------------------------<  101<H>  3.8   |  26  |  0.84    Ca    8.9      10 Oct 2017 22:45    TPro  6.7  /  Alb  3.2<L>  /  TBili  1.1  /  DBili  x   /  AST  23  /  ALT  25  /  AlkPhos  78  10-10     LIVER FUNCTIONS - ( 10 Oct 2017 22:45 )  Alb: 3.2 g/dL / Pro: 6.7 gm/dL / ALK PHOS: 78 U/L / ALT: 25 U/L / AST: 23 U/L / GGT: x           Urinalysis Basic - ( 10 Oct 2017 22:45 )    Color: Yellow / Appearance: Clear / S.020 / pH: x  Gluc: x / Ketone: Large  / Bili: Negative / Urobili: 4 mg/dL   Blood: x / Protein: 30 mg/dL / Nitrite: Negative   Leuk Esterase: Trace / RBC: 6-10 /HPF / WBC 0-2   Sq Epi: x / Non Sq Epi: Occasional / Bacteria: Occasional          Radiology:    CXR: preliminary ?basal pulmonary infiltrates    Advanced directives addressed: full resuscitation

## 2017-10-12 LAB
ANION GAP SERPL CALC-SCNC: 9 MMOL/L — SIGNIFICANT CHANGE UP (ref 5–17)
BUN SERPL-MCNC: 13 MG/DL — SIGNIFICANT CHANGE UP (ref 7–23)
CALCIUM SERPL-MCNC: 8.7 MG/DL — SIGNIFICANT CHANGE UP (ref 8.5–10.1)
CHLORIDE SERPL-SCNC: 107 MMOL/L — SIGNIFICANT CHANGE UP (ref 96–108)
CO2 SERPL-SCNC: 24 MMOL/L — SIGNIFICANT CHANGE UP (ref 22–31)
CREAT SERPL-MCNC: 0.78 MG/DL — SIGNIFICANT CHANGE UP (ref 0.5–1.3)
CULTURE RESULTS: NO GROWTH — SIGNIFICANT CHANGE UP
GLUCOSE SERPL-MCNC: 102 MG/DL — HIGH (ref 70–99)
POTASSIUM SERPL-MCNC: 3.8 MMOL/L — SIGNIFICANT CHANGE UP (ref 3.5–5.3)
POTASSIUM SERPL-SCNC: 3.8 MMOL/L — SIGNIFICANT CHANGE UP (ref 3.5–5.3)
SODIUM SERPL-SCNC: 140 MMOL/L — SIGNIFICANT CHANGE UP (ref 135–145)
SPECIMEN SOURCE: SIGNIFICANT CHANGE UP

## 2017-10-12 RX ADMIN — APIXABAN 2.5 MILLIGRAM(S): 2.5 TABLET, FILM COATED ORAL at 05:27

## 2017-10-12 RX ADMIN — CEFEPIME 100 MILLIGRAM(S): 1 INJECTION, POWDER, FOR SOLUTION INTRAMUSCULAR; INTRAVENOUS at 06:11

## 2017-10-12 RX ADMIN — Medication 100 MILLIGRAM(S): at 22:19

## 2017-10-12 RX ADMIN — MEMANTINE HYDROCHLORIDE 10 MILLIGRAM(S): 10 TABLET ORAL at 17:51

## 2017-10-12 RX ADMIN — MEMANTINE HYDROCHLORIDE 10 MILLIGRAM(S): 10 TABLET ORAL at 05:27

## 2017-10-12 RX ADMIN — DONEPEZIL HYDROCHLORIDE 5 MILLIGRAM(S): 10 TABLET, FILM COATED ORAL at 11:00

## 2017-10-12 RX ADMIN — Medication 25 MILLIGRAM(S): at 05:27

## 2017-10-12 RX ADMIN — Medication 1000 UNIT(S): at 11:00

## 2017-10-12 RX ADMIN — SIMVASTATIN 40 MILLIGRAM(S): 20 TABLET, FILM COATED ORAL at 22:19

## 2017-10-12 RX ADMIN — AZITHROMYCIN 500 MILLIGRAM(S): 500 TABLET, FILM COATED ORAL at 11:00

## 2017-10-12 RX ADMIN — PREGABALIN 1000 MICROGRAM(S): 225 CAPSULE ORAL at 11:00

## 2017-10-12 RX ADMIN — APIXABAN 2.5 MILLIGRAM(S): 2.5 TABLET, FILM COATED ORAL at 17:50

## 2017-10-12 RX ADMIN — DULOXETINE HYDROCHLORIDE 60 MILLIGRAM(S): 30 CAPSULE, DELAYED RELEASE ORAL at 22:19

## 2017-10-12 RX ADMIN — CEFEPIME 100 MILLIGRAM(S): 1 INJECTION, POWDER, FOR SOLUTION INTRAMUSCULAR; INTRAVENOUS at 17:51

## 2017-10-12 NOTE — PHYSICAL THERAPY INITIAL EVALUATION ADULT - ADDITIONAL COMMENTS
pt adm from [facility], pt reports amb w/ or w/o RW

## 2017-10-12 NOTE — PROGRESS NOTE ADULT - SUBJECTIVE AND OBJECTIVE BOX
CC:  SOB.  lethargy.    HPI:  89F.  presents to ED w/ SOB.  [facility] resident (Almita).  lethargy.  recent URI.    PMHx:  dementia;  HTN;  hyperlipidemia;  AF (E);  spinal stenosis;  depression/anxiety.    10/12-daughter at bedside.  patient reports her breathing is better.    ROS:  (-) cough.    well appearing elderly wf.  NCAT.  neck supple.  lungs diminished breath sounds.  heart RRR.  NS1S2.  no MRG.  abd soft.  NT.  ND.  nml BS.  ext no edema.  neuro confused.    MEDICATIONS  (STANDING):  apixaban 2.5 milliGRAM(s) Oral every 12 hours  azithromycin   Tablet 500 milliGRAM(s) Oral daily  cefepime  IVPB 1000 milliGRAM(s) IV Intermittent every 12 hours  cholecalciferol 1000 Unit(s) Oral daily  cyanocobalamin 1000 MICROGram(s) Oral daily  docusate sodium 100 milliGRAM(s) Oral at bedtime  donepezil 5 milliGRAM(s) Oral daily  DULoxetine 60 milliGRAM(s) Oral at bedtime  memantine 10 milliGRAM(s) Oral two times a day  metoprolol succinate ER 25 milliGRAM(s) Oral daily  oxyCODONE    5 mG/acetaminophen 325 mG 1 Tablet(s) Oral at bedtime  simvastatin 40 milliGRAM(s) Oral at bedtime    MEDICATIONS  (PRN):  acetaminophen   Tablet 650 milliGRAM(s) Oral every 6 hours PRN For Temp greater than 38 C (100.4 F)  ondansetron Injectable 4 milliGRAM(s) IV Push every 6 hours PRN Nausea  oxyCODONE    5 mG/acetaminophen 325 mG 0.5 Tablet(s) Oral daily PRN Moderate Pain (4 - 6)  senna 2 Tablet(s) Oral at bedtime PRN Constipation    Vital Signs Last 24 Hrs  T(C): 36.7 (12 Oct 2017 10:31), Max: 37.2 (12 Oct 2017 05:09)  T(F): 98.1 (12 Oct 2017 10:31), Max: 99 (12 Oct 2017 05:09)  HR: 97 (12 Oct 2017 10:31) (97 - 123)  BP: 115/72 (12 Oct 2017 10:31) (102/57 - 156/65)  BP(mean): 83 (12 Oct 2017 05:09) (83 - 83)  RR: 18 (12 Oct 2017 10:31) (18 - 18)  SpO2: 97% (12 Oct 2017 10:31) (93% - 97%)                        11.3   10.6  )-----------( 150      ( 11 Oct 2017 09:53 )             34.4     PT/INR - ( 10 Oct 2017 22:45 )   PT: 15.9 sec;   INR: 1.46 ratio         PTT - ( 10 Oct 2017 22:45 )  PTT:33.2 sec  10-12    140  |  107  |  13  ----------------------------<  102<H>  3.8   |  24  |  0.78    Ca    8.7      12 Oct 2017 05:54    TPro  6.7  /  Alb  3.2<L>  /  TBili  1.1  /  DBili  x   /  AST  23  /  ALT  25  /  AlkPhos  78  10-10    multifocal pneumonia w/ sepsis upon admission.  resistant and gram negative organisms are possible.  -BCx, NTD.  -c/w cefepime + azithromycin.  -ID following.  -speech pathology input noted.  regular consistency diet recommended.  -d/w dauther.

## 2017-10-12 NOTE — PROGRESS NOTE ADULT - SUBJECTIVE AND OBJECTIVE BOX
Patient is a 89y old  Female who presents with a chief complaint of SOB, lethargy (11 Oct 2017 03:37)    HPI:  89 y/o female with h/o dementia, HTN, HLD, A.Fib, spinal stenosis, depression, anxiety disorder was admitted on 10/10 with shortness of breath for 1-2 days. Pt was sent from [facility] due to lethargy and recent URI symptoms. Pt denied any sore throat. Pt is poor historian due to dementia. In ER, she was noted febrile to 102.8F and received cefepime.    OOB to chair  More alert  Has dry cough  Fever is down    MEDICATIONS  (STANDING):  apixaban 2.5 milliGRAM(s) Oral every 12 hours  azithromycin   Tablet 500 milliGRAM(s) Oral daily  cefepime  IVPB 1000 milliGRAM(s) IV Intermittent every 12 hours  cholecalciferol 1000 Unit(s) Oral daily  cyanocobalamin 1000 MICROGram(s) Oral daily  docusate sodium 100 milliGRAM(s) Oral at bedtime  donepezil 5 milliGRAM(s) Oral daily  DULoxetine 60 milliGRAM(s) Oral at bedtime  memantine 10 milliGRAM(s) Oral two times a day  metoprolol succinate ER 25 milliGRAM(s) Oral daily  oxyCODONE    5 mG/acetaminophen 325 mG 1 Tablet(s) Oral at bedtime  simvastatin 40 milliGRAM(s) Oral at bedtime    MEDICATIONS  (PRN):  acetaminophen   Tablet 650 milliGRAM(s) Oral every 6 hours PRN For Temp greater than 38 C (100.4 F)  ondansetron Injectable 4 milliGRAM(s) IV Push every 6 hours PRN Nausea  oxyCODONE    5 mG/acetaminophen 325 mG 0.5 Tablet(s) Oral daily PRN Moderate Pain (4 - 6)  senna 2 Tablet(s) Oral at bedtime PRN Constipation      Vital Signs Last 24 Hrs  T(C): 36.7 (12 Oct 2017 10:31), Max: 37.2 (12 Oct 2017 05:09)  T(F): 98.1 (12 Oct 2017 10:31), Max: 99 (12 Oct 2017 05:09)  HR: 97 (12 Oct 2017 10:31) (97 - 123)  BP: 115/72 (12 Oct 2017 10:31) (102/57 - 156/65)  BP(mean): 83 (12 Oct 2017 05:09) (83 - 83)  RR: 18 (12 Oct 2017 10:31) (18 - 18)  SpO2: 97% (12 Oct 2017 10:31) (93% - 97%)    Physical Exam:      Constitutional: frail looking  HEENT: NC/AT, EOMI, PERRLA  Neck: supple  Back: no tenderness  Respiratory: crackles at bases  Cardiovascular: S1S2 regular, no murmurs  Abdomen: soft, not tender, not distended, positive BS  Genitourinary: no LN palpable  Rectal: deferred  Musculoskeletal: no muscle tenderness, no joint swelling or tenderness  Extremities: no pedal edema  Neurological: confused, moving all extremities  Skin: no rashes    Labs:                        11.3   10.6  )-----------( 150      ( 11 Oct 2017 09:53 )             34.4     1012    140  |  107  |  13  ----------------------------<  102<H>  3.8   |  24  |  0.78    Ca    8.7      12 Oct 2017 05:54    TPro  6.7  /  Alb  3.2<L>  /  TBili  1.1  /  DBili  x   /  AST  23  /  ALT  25  /  AlkPhos  78  10-10                                 12.6   15.6  )-----------( 179      ( 10 Oct 2017 22:45 )             36.2     10-10    135  |  101  |  16  ----------------------------<  101<H>  3.8   |  26  |  0.84    Ca    8.9      10 Oct 2017 22:45    TPro  6.7  /  Alb  3.2<L>  /  TBili  1.1  /  DBili  x   /  AST  23  /  ALT  25  /  AlkPhos  78  10-10     LIVER FUNCTIONS - ( 10 Oct 2017 22:45 )  Alb: 3.2 g/dL / Pro: 6.7 gm/dL / ALK PHOS: 78 U/L / ALT: 25 U/L / AST: 23 U/L / GGT: x           Urinalysis Basic - ( 10 Oct 2017 22:45 )    Color: Yellow / Appearance: Clear / S.020 / pH: x  Gluc: x / Ketone: Large  / Bili: Negative / Urobili: 4 mg/dL   Blood: x / Protein: 30 mg/dL / Nitrite: Negative   Leuk Esterase: Trace / RBC: 6-10 /HPF / WBC 0-2   Sq Epi: x / Non Sq Epi: Occasional / Bacteria: Occasional      Culture - Urine (collected 10 Oct 2017 22:45)  Source: .Urine Catheterized  Final Report (12 Oct 2017 11:50):    No growth    Culture - Blood (collected 10 Oct 2017 22:45)  Source: .Blood Blood-Peripheral  Preliminary Report (12 Oct 2017 10:02):    No growth to date.    Culture - Blood (collected 10 Oct 2017 22:45)  Source: .Blood Blood-Peripheral  Preliminary Report (12 Oct 2017 10:01):    No growth to date.          Radiology:    CXR: preliminary ?basal pulmonary infiltrates    Advanced directives addressed: full resuscitation

## 2017-10-13 LAB
ANION GAP SERPL CALC-SCNC: 8 MMOL/L — SIGNIFICANT CHANGE UP (ref 5–17)
BUN SERPL-MCNC: 11 MG/DL — SIGNIFICANT CHANGE UP (ref 7–23)
CALCIUM SERPL-MCNC: 9.1 MG/DL — SIGNIFICANT CHANGE UP (ref 8.5–10.1)
CHLORIDE SERPL-SCNC: 106 MMOL/L — SIGNIFICANT CHANGE UP (ref 96–108)
CO2 SERPL-SCNC: 25 MMOL/L — SIGNIFICANT CHANGE UP (ref 22–31)
CREAT SERPL-MCNC: 0.72 MG/DL — SIGNIFICANT CHANGE UP (ref 0.5–1.3)
GLUCOSE SERPL-MCNC: 107 MG/DL — HIGH (ref 70–99)
HCT VFR BLD CALC: 34 % — LOW (ref 34.5–45)
HGB BLD-MCNC: 11.5 G/DL — SIGNIFICANT CHANGE UP (ref 11.5–15.5)
MCHC RBC-ENTMCNC: 31.8 PG — SIGNIFICANT CHANGE UP (ref 27–34)
MCHC RBC-ENTMCNC: 33.7 GM/DL — SIGNIFICANT CHANGE UP (ref 32–36)
MCV RBC AUTO: 94.4 FL — SIGNIFICANT CHANGE UP (ref 80–100)
PLATELET # BLD AUTO: 200 K/UL — SIGNIFICANT CHANGE UP (ref 150–400)
POTASSIUM SERPL-MCNC: 3.5 MMOL/L — SIGNIFICANT CHANGE UP (ref 3.5–5.3)
POTASSIUM SERPL-SCNC: 3.5 MMOL/L — SIGNIFICANT CHANGE UP (ref 3.5–5.3)
RBC # BLD: 3.6 M/UL — LOW (ref 3.8–5.2)
RBC # FLD: 13.3 % — SIGNIFICANT CHANGE UP (ref 10.3–14.5)
SODIUM SERPL-SCNC: 139 MMOL/L — SIGNIFICANT CHANGE UP (ref 135–145)
WBC # BLD: 7.9 K/UL — SIGNIFICANT CHANGE UP (ref 3.8–10.5)
WBC # FLD AUTO: 7.9 K/UL — SIGNIFICANT CHANGE UP (ref 3.8–10.5)

## 2017-10-13 RX ADMIN — PREGABALIN 1000 MICROGRAM(S): 225 CAPSULE ORAL at 11:35

## 2017-10-13 RX ADMIN — Medication 100 MILLIGRAM(S): at 22:03

## 2017-10-13 RX ADMIN — Medication 1000 UNIT(S): at 11:35

## 2017-10-13 RX ADMIN — APIXABAN 2.5 MILLIGRAM(S): 2.5 TABLET, FILM COATED ORAL at 17:46

## 2017-10-13 RX ADMIN — AZITHROMYCIN 500 MILLIGRAM(S): 500 TABLET, FILM COATED ORAL at 11:35

## 2017-10-13 RX ADMIN — DONEPEZIL HYDROCHLORIDE 5 MILLIGRAM(S): 10 TABLET, FILM COATED ORAL at 11:35

## 2017-10-13 RX ADMIN — MEMANTINE HYDROCHLORIDE 10 MILLIGRAM(S): 10 TABLET ORAL at 17:46

## 2017-10-13 RX ADMIN — APIXABAN 2.5 MILLIGRAM(S): 2.5 TABLET, FILM COATED ORAL at 06:09

## 2017-10-13 RX ADMIN — MEMANTINE HYDROCHLORIDE 10 MILLIGRAM(S): 10 TABLET ORAL at 06:09

## 2017-10-13 RX ADMIN — DULOXETINE HYDROCHLORIDE 60 MILLIGRAM(S): 30 CAPSULE, DELAYED RELEASE ORAL at 22:03

## 2017-10-13 RX ADMIN — CEFEPIME 100 MILLIGRAM(S): 1 INJECTION, POWDER, FOR SOLUTION INTRAMUSCULAR; INTRAVENOUS at 06:34

## 2017-10-13 RX ADMIN — SIMVASTATIN 40 MILLIGRAM(S): 20 TABLET, FILM COATED ORAL at 22:03

## 2017-10-13 RX ADMIN — CEFEPIME 100 MILLIGRAM(S): 1 INJECTION, POWDER, FOR SOLUTION INTRAMUSCULAR; INTRAVENOUS at 17:46

## 2017-10-13 RX ADMIN — Medication 25 MILLIGRAM(S): at 06:09

## 2017-10-13 NOTE — PROGRESS NOTE ADULT - SUBJECTIVE AND OBJECTIVE BOX
CC:  SOB.  lethargy.    HPI:  89F.  presents to ED w/ SOB.  [facility] resident (Almita).  lethargy.  recent URI.    PMHx:  dementia;  HTN;  hyperlipidemia;  AF (E);  spinal stenosis;  depression/anxiety.    10/13-daughter at bedside.  sleeping.    ROS:  (-) cough.    well appearing elderly wf.  NCAT.  neck supple.  lungs diminished breath sounds.  heart RRR.  NS1S2.  no MRG.  abd soft.  NT.  ND.  nml BS.  ext no edema.  neuro confused.    MEDICATIONS  (STANDING):  apixaban 2.5 milliGRAM(s) Oral every 12 hours  azithromycin   Tablet 500 milliGRAM(s) Oral daily  cefepime  IVPB 1000 milliGRAM(s) IV Intermittent every 12 hours  cholecalciferol 1000 Unit(s) Oral daily  cyanocobalamin 1000 MICROGram(s) Oral daily  docusate sodium 100 milliGRAM(s) Oral at bedtime  donepezil 5 milliGRAM(s) Oral daily  DULoxetine 60 milliGRAM(s) Oral at bedtime  memantine 10 milliGRAM(s) Oral two times a day  metoprolol succinate ER 25 milliGRAM(s) Oral daily  oxyCODONE    5 mG/acetaminophen 325 mG 1 Tablet(s) Oral at bedtime  simvastatin 40 milliGRAM(s) Oral at bedtime    MEDICATIONS  (PRN):  acetaminophen   Tablet 650 milliGRAM(s) Oral every 6 hours PRN For Temp greater than 38 C (100.4 F)  ondansetron Injectable 4 milliGRAM(s) IV Push every 6 hours PRN Nausea  oxyCODONE    5 mG/acetaminophen 325 mG 0.5 Tablet(s) Oral daily PRN Moderate Pain (4 - 6)  senna 2 Tablet(s) Oral at bedtime PRN Constipation    Vital Signs Last 24 Hrs  T(C): 36.7 (12 Oct 2017 10:31), Max: 37.2 (12 Oct 2017 05:09)  T(F): 98.1 (12 Oct 2017 10:31), Max: 99 (12 Oct 2017 05:09)  HR: 97 (12 Oct 2017 10:31) (97 - 123)  BP: 115/72 (12 Oct 2017 10:31) (102/57 - 156/65)  BP(mean): 83 (12 Oct 2017 05:09) (83 - 83)  RR: 18 (12 Oct 2017 10:31) (18 - 18)  SpO2: 97% (12 Oct 2017 10:31) (93% - 97%)                        11.3   10.6  )-----------( 150      ( 11 Oct 2017 09:53 )             34.4     PT/INR - ( 10 Oct 2017 22:45 )   PT: 15.9 sec;   INR: 1.46 ratio         PTT - ( 10 Oct 2017 22:45 )  PTT:33.2 sec  10-12    140  |  107  |  13  ----------------------------<  102<H>  3.8   |  24  |  0.78    Ca    8.7      12 Oct 2017 05:54    TPro  6.7  /  Alb  3.2<L>  /  TBili  1.1  /  DBili  x   /  AST  23  /  ALT  25  /  AlkPhos  78  10-10    multifocal pneumonia w/ sepsis upon admission.  resistant and gram negative organisms are possible.  -BCx, NTD.  -c/w cefepime + azithromycin.  -plan to transition to PO ABx, Ceftin 250mg po q12h x 5 more days.  -ID following.  -speech pathology input noted.  regular consistency diet recommended.  -d/w daughter.  -d/w ID.

## 2017-10-13 NOTE — PROGRESS NOTE ADULT - SUBJECTIVE AND OBJECTIVE BOX
Patient is a 89y old  Female who presents with a chief complaint of SOB, lethargy (11 Oct 2017 03:37)    HPI:  89 y/o female with h/o dementia, HTN, HLD, A.Fib, spinal stenosis, depression, anxiety disorder was admitted on 10/10 with shortness of breath for 1-2 days. Pt was sent from [facility] due to lethargy and recent URI symptoms. Pt denied any sore throat. Pt is poor historian due to dementia. In ER, she was noted febrile to 102.8F and received cefepime.    OOB to chair  Confused  Has dry cough  No fever or chills    MEDICATIONS  (STANDING):  apixaban 2.5 milliGRAM(s) Oral every 12 hours  azithromycin   Tablet 500 milliGRAM(s) Oral daily  cefepime  IVPB 1000 milliGRAM(s) IV Intermittent every 12 hours  cholecalciferol 1000 Unit(s) Oral daily  cyanocobalamin 1000 MICROGram(s) Oral daily  docusate sodium 100 milliGRAM(s) Oral at bedtime  donepezil 5 milliGRAM(s) Oral daily  DULoxetine 60 milliGRAM(s) Oral at bedtime  memantine 10 milliGRAM(s) Oral two times a day  metoprolol succinate ER 25 milliGRAM(s) Oral daily  oxyCODONE    5 mG/acetaminophen 325 mG 1 Tablet(s) Oral at bedtime  simvastatin 40 milliGRAM(s) Oral at bedtime    MEDICATIONS  (PRN):  acetaminophen   Tablet 650 milliGRAM(s) Oral every 6 hours PRN For Temp greater than 38 C (100.4 F)  ondansetron Injectable 4 milliGRAM(s) IV Push every 6 hours PRN Nausea  oxyCODONE    5 mG/acetaminophen 325 mG 0.5 Tablet(s) Oral daily PRN Moderate Pain (4 - 6)  senna 2 Tablet(s) Oral at bedtime PRN Constipation      Vital Signs Last 24 Hrs  T(C): 36.9 (13 Oct 2017 04:52), Max: 37.4 (12 Oct 2017 17:00)  T(F): 98.5 (13 Oct 2017 04:52), Max: 99.3 (12 Oct 2017 17:00)  HR: 112 (13 Oct 2017 04:52) (88 - 114)  BP: 135/94 (13 Oct 2017 04:52) (121/70 - 135/94)  BP(mean): 103 (13 Oct 2017 04:52) (103 - 103)  RR: 18 (13 Oct 2017 04:52) (18 - 18)  SpO2: 96% (13 Oct 2017 04:52) (94% - 96%)    Physical Exam:      Constitutional: frail looking  HEENT: NC/AT, EOMI, PERRLA  Neck: supple  Back: no tenderness  Respiratory: crackles at bases  Cardiovascular: S1S2 regular, no murmurs  Abdomen: soft, not tender, not distended, positive BS  Genitourinary: no LN palpable  Rectal: deferred  Musculoskeletal: no muscle tenderness, no joint swelling or tenderness  Extremities: no pedal edema  Neurological: confused, moving all extremities  Skin: no rashes    Labs:                        11.3   10.6  )-----------( 150      ( 11 Oct 2017 09:53 )             34.4     10    140  |  107  |  13  ----------------------------<  102<H>  3.8   |  24  |  0.78    Ca    8.7      12 Oct 2017 05:54    TPro  6.7  /  Alb  3.2<L>  /  TBili  1.1  /  DBili  x   /  AST  23  /  ALT  25  /  AlkPhos  78  10-10                                 12.6   15.6  )-----------( 179      ( 10 Oct 2017 22:45 )             36.2     10-10    135  |  101  |  16  ----------------------------<  101<H>  3.8   |  26  |  0.84    Ca    8.9      10 Oct 2017 22:45    TPro  6.7  /  Alb  3.2<L>  /  TBili  1.1  /  DBili  x   /  AST  23  /  ALT  25  /  AlkPhos  78  10-10     LIVER FUNCTIONS - ( 10 Oct 2017 22:45 )  Alb: 3.2 g/dL / Pro: 6.7 gm/dL / ALK PHOS: 78 U/L / ALT: 25 U/L / AST: 23 U/L / GGT: x           Urinalysis Basic - ( 10 Oct 2017 22:45 )    Color: Yellow / Appearance: Clear / S.020 / pH: x  Gluc: x / Ketone: Large  / Bili: Negative / Urobili: 4 mg/dL   Blood: x / Protein: 30 mg/dL / Nitrite: Negative   Leuk Esterase: Trace / RBC: 6-10 /HPF / WBC 0-2   Sq Epi: x / Non Sq Epi: Occasional / Bacteria: Occasional      Culture - Urine (collected 10 Oct 2017 22:45)  Source: .Urine Catheterized  Final Report (12 Oct 2017 11:50):    No growth    Culture - Blood (collected 10 Oct 2017 22:45)  Source: .Blood Blood-Peripheral  Preliminary Report (12 Oct 2017 10:02):    No growth to date.    Culture - Blood (collected 10 Oct 2017 22:45)  Source: .Blood Blood-Peripheral  Preliminary Report (12 Oct 2017 10:01):    No growth to date.          Radiology:    CXR: preliminary ?basal pulmonary infiltrates    Advanced directives addressed: full resuscitation

## 2017-10-14 PROCEDURE — 71250 CT THORAX DX C-: CPT | Mod: 26

## 2017-10-14 RX ORDER — CEFUROXIME AXETIL 250 MG
250 TABLET ORAL EVERY 12 HOURS
Qty: 0 | Refills: 0 | Status: DISCONTINUED | OUTPATIENT
Start: 2017-10-14 | End: 2017-10-16

## 2017-10-14 RX ADMIN — Medication 25 MILLIGRAM(S): at 06:05

## 2017-10-14 RX ADMIN — Medication 1000 UNIT(S): at 12:25

## 2017-10-14 RX ADMIN — Medication 250 MILLIGRAM(S): at 17:47

## 2017-10-14 RX ADMIN — PREGABALIN 1000 MICROGRAM(S): 225 CAPSULE ORAL at 12:25

## 2017-10-14 RX ADMIN — Medication 100 MILLIGRAM(S): at 21:01

## 2017-10-14 RX ADMIN — MEMANTINE HYDROCHLORIDE 10 MILLIGRAM(S): 10 TABLET ORAL at 06:05

## 2017-10-14 RX ADMIN — CEFEPIME 100 MILLIGRAM(S): 1 INJECTION, POWDER, FOR SOLUTION INTRAMUSCULAR; INTRAVENOUS at 06:05

## 2017-10-14 RX ADMIN — AZITHROMYCIN 500 MILLIGRAM(S): 500 TABLET, FILM COATED ORAL at 12:25

## 2017-10-14 RX ADMIN — MEMANTINE HYDROCHLORIDE 10 MILLIGRAM(S): 10 TABLET ORAL at 17:47

## 2017-10-14 RX ADMIN — DONEPEZIL HYDROCHLORIDE 5 MILLIGRAM(S): 10 TABLET, FILM COATED ORAL at 12:25

## 2017-10-14 RX ADMIN — SIMVASTATIN 40 MILLIGRAM(S): 20 TABLET, FILM COATED ORAL at 21:01

## 2017-10-14 RX ADMIN — DULOXETINE HYDROCHLORIDE 60 MILLIGRAM(S): 30 CAPSULE, DELAYED RELEASE ORAL at 21:01

## 2017-10-14 RX ADMIN — APIXABAN 2.5 MILLIGRAM(S): 2.5 TABLET, FILM COATED ORAL at 06:05

## 2017-10-14 RX ADMIN — APIXABAN 2.5 MILLIGRAM(S): 2.5 TABLET, FILM COATED ORAL at 17:47

## 2017-10-14 NOTE — PROGRESS NOTE ADULT - SUBJECTIVE AND OBJECTIVE BOX
CC:  SOB.  lethargy.    HPI:  89F.  presents to ED w/ SOB.  [facility] resident (Almita).  lethargy.  recent URI.    PMHx:  dementia;  HTN;  hyperlipidemia;  AF (E);  spinal stenosis;  depression/anxiety.    10/14-OOB in chair.  awake, alert and appropriate.  "[facility]."  can not recall the year.  breathing comfortably.  RN reports desaturates on RA upon ambulation.    ROS:  (+) cough.    well appearing elderly wf.  NCAT.  neck supple.  lungs (+) faint b/l rale.  diminished breath sounds.  heart RRR.  NS1S2.  no MRG.  abd soft.  NT.  ND.  nml BS.  ext no edema.  neuro confused.    MEDICATIONS  (STANDING):  apixaban 2.5 milliGRAM(s) Oral every 12 hours  azithromycin   Tablet 500 milliGRAM(s) Oral daily  cefepime  IVPB 1000 milliGRAM(s) IV Intermittent every 12 hours  cholecalciferol 1000 Unit(s) Oral daily  cyanocobalamin 1000 MICROGram(s) Oral daily  docusate sodium 100 milliGRAM(s) Oral at bedtime  donepezil 5 milliGRAM(s) Oral daily  DULoxetine 60 milliGRAM(s) Oral at bedtime  memantine 10 milliGRAM(s) Oral two times a day  metoprolol succinate ER 25 milliGRAM(s) Oral daily  oxyCODONE    5 mG/acetaminophen 325 mG 1 Tablet(s) Oral at bedtime  simvastatin 40 milliGRAM(s) Oral at bedtime    MEDICATIONS  (PRN):  acetaminophen   Tablet 650 milliGRAM(s) Oral every 6 hours PRN For Temp greater than 38 C (100.4 F)  ondansetron Injectable 4 milliGRAM(s) IV Push every 6 hours PRN Nausea  oxyCODONE    5 mG/acetaminophen 325 mG 0.5 Tablet(s) Oral daily PRN Moderate Pain (4 - 6)  senna 2 Tablet(s) Oral at bedtime PRN Constipation    Vital Signs Last 24 Hrs  T(C): 36.7 (12 Oct 2017 10:31), Max: 37.2 (12 Oct 2017 05:09)  T(F): 98.1 (12 Oct 2017 10:31), Max: 99 (12 Oct 2017 05:09)  HR: 97 (12 Oct 2017 10:31) (97 - 123)  BP: 115/72 (12 Oct 2017 10:31) (102/57 - 156/65)  BP(mean): 83 (12 Oct 2017 05:09) (83 - 83)  RR: 18 (12 Oct 2017 10:31) (18 - 18)  SpO2: 97% (12 Oct 2017 10:31) (93% - 97%)                        11.3   10.6  )-----------( 150      ( 11 Oct 2017 09:53 )             34.4     PT/INR - ( 10 Oct 2017 22:45 )   PT: 15.9 sec;   INR: 1.46 ratio         PTT - ( 10 Oct 2017 22:45 )  PTT:33.2 sec  10-12    140  |  107  |  13  ----------------------------<  102<H>  3.8   |  24  |  0.78    Ca    8.7      12 Oct 2017 05:54    TPro  6.7  /  Alb  3.2<L>  /  TBili  1.1  /  DBili  x   /  AST  23  /  ALT  25  /  AlkPhos  78  10-10    hypoxia.  -chest CT.  -supplemental O2.  -incentive spirometery.  -may require to leave w/ O2.  -Pulmonary consult.    multifocal pneumonia w/ sepsis upon admission.  resistant and gram negative organisms are possible.  -BCx, NTD.  -start Ceftin 250mg po q12h x 5 more days.  -ID following.  -speech pathology input noted.  regular consistency diet recommended.  -message left for daughter.  awaiting call back.  -d/w Pulmonary.

## 2017-10-15 LAB
ANION GAP SERPL CALC-SCNC: 7 MMOL/L — SIGNIFICANT CHANGE UP (ref 5–17)
BUN SERPL-MCNC: 9 MG/DL — SIGNIFICANT CHANGE UP (ref 7–23)
CALCIUM SERPL-MCNC: 8.9 MG/DL — SIGNIFICANT CHANGE UP (ref 8.5–10.1)
CHLORIDE SERPL-SCNC: 104 MMOL/L — SIGNIFICANT CHANGE UP (ref 96–108)
CO2 SERPL-SCNC: 28 MMOL/L — SIGNIFICANT CHANGE UP (ref 22–31)
CREAT SERPL-MCNC: 0.74 MG/DL — SIGNIFICANT CHANGE UP (ref 0.5–1.3)
GLUCOSE SERPL-MCNC: 80 MG/DL — SIGNIFICANT CHANGE UP (ref 70–99)
HCT VFR BLD CALC: 34.6 % — SIGNIFICANT CHANGE UP (ref 34.5–45)
HGB BLD-MCNC: 11.3 G/DL — LOW (ref 11.5–15.5)
MCHC RBC-ENTMCNC: 31.4 PG — SIGNIFICANT CHANGE UP (ref 27–34)
MCHC RBC-ENTMCNC: 32.7 GM/DL — SIGNIFICANT CHANGE UP (ref 32–36)
MCV RBC AUTO: 96.2 FL — SIGNIFICANT CHANGE UP (ref 80–100)
PLATELET # BLD AUTO: 208 K/UL — SIGNIFICANT CHANGE UP (ref 150–400)
POTASSIUM SERPL-MCNC: 3.7 MMOL/L — SIGNIFICANT CHANGE UP (ref 3.5–5.3)
POTASSIUM SERPL-SCNC: 3.7 MMOL/L — SIGNIFICANT CHANGE UP (ref 3.5–5.3)
RBC # BLD: 3.6 M/UL — LOW (ref 3.8–5.2)
RBC # FLD: 13.9 % — SIGNIFICANT CHANGE UP (ref 10.3–14.5)
SODIUM SERPL-SCNC: 139 MMOL/L — SIGNIFICANT CHANGE UP (ref 135–145)
WBC # BLD: 8.7 K/UL — SIGNIFICANT CHANGE UP (ref 3.8–10.5)
WBC # FLD AUTO: 8.7 K/UL — SIGNIFICANT CHANGE UP (ref 3.8–10.5)

## 2017-10-15 RX ORDER — ALBUTEROL 90 UG/1
2.5 AEROSOL, METERED ORAL EVERY 4 HOURS
Qty: 0 | Refills: 0 | Status: DISCONTINUED | OUTPATIENT
Start: 2017-10-15 | End: 2017-10-16

## 2017-10-15 RX ORDER — FUROSEMIDE 40 MG
20 TABLET ORAL ONCE
Qty: 0 | Refills: 0 | Status: COMPLETED | OUTPATIENT
Start: 2017-10-15 | End: 2017-10-15

## 2017-10-15 RX ADMIN — APIXABAN 2.5 MILLIGRAM(S): 2.5 TABLET, FILM COATED ORAL at 05:21

## 2017-10-15 RX ADMIN — PREGABALIN 1000 MICROGRAM(S): 225 CAPSULE ORAL at 12:04

## 2017-10-15 RX ADMIN — DULOXETINE HYDROCHLORIDE 60 MILLIGRAM(S): 30 CAPSULE, DELAYED RELEASE ORAL at 21:35

## 2017-10-15 RX ADMIN — MEMANTINE HYDROCHLORIDE 10 MILLIGRAM(S): 10 TABLET ORAL at 05:21

## 2017-10-15 RX ADMIN — Medication 250 MILLIGRAM(S): at 05:21

## 2017-10-15 RX ADMIN — Medication 20 MILLIGRAM(S): at 12:04

## 2017-10-15 RX ADMIN — MEMANTINE HYDROCHLORIDE 10 MILLIGRAM(S): 10 TABLET ORAL at 18:03

## 2017-10-15 RX ADMIN — Medication 250 MILLIGRAM(S): at 18:03

## 2017-10-15 RX ADMIN — SIMVASTATIN 40 MILLIGRAM(S): 20 TABLET, FILM COATED ORAL at 21:35

## 2017-10-15 RX ADMIN — Medication 100 MILLIGRAM(S): at 21:35

## 2017-10-15 RX ADMIN — APIXABAN 2.5 MILLIGRAM(S): 2.5 TABLET, FILM COATED ORAL at 18:03

## 2017-10-15 RX ADMIN — DONEPEZIL HYDROCHLORIDE 5 MILLIGRAM(S): 10 TABLET, FILM COATED ORAL at 12:04

## 2017-10-15 RX ADMIN — Medication 25 MILLIGRAM(S): at 05:21

## 2017-10-15 RX ADMIN — Medication 1000 UNIT(S): at 12:03

## 2017-10-15 NOTE — PROGRESS NOTE ADULT - SUBJECTIVE AND OBJECTIVE BOX
CC:  SOB.  lethargy.    HPI:  89F.  presents to ED w/ SOB.  [facility] resident ([place]).  lethargy.  recent URI.    PMHx:  dementia;  HTN;  hyperlipidemia;  AF (E);  spinal stenosis;  depression/anxiety.    10/15-denies respiratory symptoms.      ROS:  (+) minimal cough.    well appearing elderly wf.  NCAT.  neck supple.  lungs (+) faint b/l rale.  diminished breath sounds.  heart RRR.  NS1S2.  no MRG.  abd soft.  NT.  ND.  nml BS.  ext no edema.  neuro confused.    MEDICATIONS  (STANDING):  apixaban 2.5 milliGRAM(s) Oral every 12 hours  cefuroxime   Tablet 250 milliGRAM(s) Oral every 12 hours  cholecalciferol 1000 Unit(s) Oral daily  cyanocobalamin 1000 MICROGram(s) Oral daily  docusate sodium 100 milliGRAM(s) Oral at bedtime  donepezil 5 milliGRAM(s) Oral daily  DULoxetine 60 milliGRAM(s) Oral at bedtime  memantine 10 milliGRAM(s) Oral two times a day  metoprolol succinate ER 25 milliGRAM(s) Oral daily  simvastatin 40 milliGRAM(s) Oral at bedtime    MEDICATIONS  (PRN):  acetaminophen   Tablet 650 milliGRAM(s) Oral every 6 hours PRN For Temp greater than 38 C (100.4 F)  ondansetron Injectable 4 milliGRAM(s) IV Push every 6 hours PRN Nausea  senna 2 Tablet(s) Oral at bedtime PRN Constipation    Vital Signs Last 24 Hrs  T(C): 36.9 (15 Oct 2017 10:14), Max: 36.9 (14 Oct 2017 21:00)  T(F): 98.5 (15 Oct 2017 10:14), Max: 98.5 (15 Oct 2017 10:14)  HR: 116 (15 Oct 2017 10:14) (81 - 116)  BP: 114/73 (15 Oct 2017 10:14) (114/73 - 164/69)  BP(mean): --  RR: 18 (15 Oct 2017 10:14) (17 - 18)  SpO2: 95% (15 Oct 2017 10:14) (95% - 100%)                        11.3   8.7   )-----------( 208      ( 15 Oct 2017 05:56 )             34.6       10-15    139  |  104  |  9   ----------------------------<  80  3.7   |  28  |  0.74    Ca    8.9      15 Oct 2017 05:56    hypoxia secondary to multilobar airspace disease, atelectasis and small b/l pleural effusions.  -chest CT reviewed with Pulmonology.  -given Lasix 20mg IV x 1 earlier.  -c/w supplemental O2 + JULEE neb PRN + incentive spirometery.  -Pulmonary following.    multifocal pneumonia w/ sepsis upon admission.    -resistant and gram negative organisms are possible.  -BCx, NTD.  -c/w Ceftin 250mg po q12h x 3 more days.  -speech pathology input noted.  regular consistency diet recommended.    hx AF.  -rate controlled.  -c/w apixaban.  -c/w metoprolol.    hx dementia.  -c/w donepezil + duloxetine + memantine.    disposition.  -document RA O2 saturation at rest and upon ambulation tomorrow.  -10/12 PT recommendations was d/c home w/ home PT.  -may need to d/c back to [facility] w/ home O2.    communication.  -d/w RN.  -d/w patient's daughter. CC:  SOB.  lethargy.    HPI:  89F.  presents to ED w/ SOB.  [facility] resident ([place]).  lethargy.  recent URI.    PMHx:  dementia;  HTN;  hyperlipidemia;  AF (E);  spinal stenosis;  depression/anxiety.    10/15-breathing comfortably at rest.      ROS:  (+) minimal cough.    well appearing elderly wf.  NCAT.  neck supple.  lungs (+) faint b/l rale.  diminished breath sounds.  heart RRR.  NS1S2.  no MRG.  abd soft.  NT.  ND.  nml BS.  ext no edema.  neuro confused.    MEDICATIONS  (STANDING):  apixaban 2.5 milliGRAM(s) Oral every 12 hours  cefuroxime   Tablet 250 milliGRAM(s) Oral every 12 hours  cholecalciferol 1000 Unit(s) Oral daily  cyanocobalamin 1000 MICROGram(s) Oral daily  docusate sodium 100 milliGRAM(s) Oral at bedtime  donepezil 5 milliGRAM(s) Oral daily  DULoxetine 60 milliGRAM(s) Oral at bedtime  memantine 10 milliGRAM(s) Oral two times a day  metoprolol succinate ER 25 milliGRAM(s) Oral daily  simvastatin 40 milliGRAM(s) Oral at bedtime    MEDICATIONS  (PRN):  acetaminophen   Tablet 650 milliGRAM(s) Oral every 6 hours PRN For Temp greater than 38 C (100.4 F)  ondansetron Injectable 4 milliGRAM(s) IV Push every 6 hours PRN Nausea  senna 2 Tablet(s) Oral at bedtime PRN Constipation    Vital Signs Last 24 Hrs  T(C): 36.9 (15 Oct 2017 10:14), Max: 36.9 (14 Oct 2017 21:00)  T(F): 98.5 (15 Oct 2017 10:14), Max: 98.5 (15 Oct 2017 10:14)  HR: 116 (15 Oct 2017 10:14) (81 - 116)  BP: 114/73 (15 Oct 2017 10:14) (114/73 - 164/69)  BP(mean): --  RR: 18 (15 Oct 2017 10:14) (17 - 18)  SpO2: 95% (15 Oct 2017 10:14) (95% - 100%)                        11.3   8.7   )-----------( 208      ( 15 Oct 2017 05:56 )             34.6       10-15    139  |  104  |  9   ----------------------------<  80  3.7   |  28  |  0.74    Ca    8.9      15 Oct 2017 05:56    hypoxia secondary to multilobar airspace disease, atelectasis and small b/l pleural effusions.  -chest CT reviewed with Pulmonology.  -given Lasix 20mg IV x 1 earlier.  -c/w supplemental O2 + JULEE neb PRN + incentive spirometery.  -Pulmonary following.    multifocal pneumonia w/ sepsis upon admission.    -resistant and gram negative organisms are possible.  -BCx, NTD.  -c/w Ceftin 250mg po q12h x 3 more days.  -speech pathology input noted.  regular consistency diet recommended.    hx AF.  -rate controlled.  -c/w apixaban.  -c/w metoprolol.    hx dementia.  -c/w donepezil + duloxetine + memantine.    disposition.  -document RA O2 saturation at rest and upon ambulation tomorrow.  -10/12 PT recommendations was d/c home w/ home PT.  -may need to d/c back to [facility] w/ home O2.    communication.  -d/w RN.  -d/w patient's daughter. CC:  SOB.  lethargy.    HPI:  89F.  presents to ED w/ SOB.  [facility] resident ([place]).  lethargy.  recent URI.    PMHx:  dementia;  HTN;  hyperlipidemia;  AF (E);  spinal stenosis;  depression/anxiety.    10/15-breathing comfortably at rest.      ROS:  (+) minimal cough.    well appearing elderly wf.  NCAT.  neck supple.  lungs (+) faint b/l rale.  diminished breath sounds.  heart RRR.  NS1S2.  no MRG.  abd soft.  NT.  ND.  nml BS.  ext no edema.  neuro confused.    MEDICATIONS  (STANDING):  apixaban 2.5 milliGRAM(s) Oral every 12 hours  cefuroxime   Tablet 250 milliGRAM(s) Oral every 12 hours  cholecalciferol 1000 Unit(s) Oral daily  cyanocobalamin 1000 MICROGram(s) Oral daily  docusate sodium 100 milliGRAM(s) Oral at bedtime  donepezil 5 milliGRAM(s) Oral daily  DULoxetine 60 milliGRAM(s) Oral at bedtime  memantine 10 milliGRAM(s) Oral two times a day  metoprolol succinate ER 25 milliGRAM(s) Oral daily  simvastatin 40 milliGRAM(s) Oral at bedtime    MEDICATIONS  (PRN):  acetaminophen   Tablet 650 milliGRAM(s) Oral every 6 hours PRN For Temp greater than 38 C (100.4 F)  ondansetron Injectable 4 milliGRAM(s) IV Push every 6 hours PRN Nausea  senna 2 Tablet(s) Oral at bedtime PRN Constipation    Vital Signs Last 24 Hrs  T(C): 36.9 (15 Oct 2017 10:14), Max: 36.9 (14 Oct 2017 21:00)  T(F): 98.5 (15 Oct 2017 10:14), Max: 98.5 (15 Oct 2017 10:14)  HR: 116 (15 Oct 2017 10:14) (81 - 116)  BP: 114/73 (15 Oct 2017 10:14) (114/73 - 164/69)  BP(mean): --  RR: 18 (15 Oct 2017 10:14) (17 - 18)  SpO2: 95% (15 Oct 2017 10:14) (95% - 100%)                        11.3   8.7   )-----------( 208      ( 15 Oct 2017 05:56 )             34.6       10-15    139  |  104  |  9   ----------------------------<  80  3.7   |  28  |  0.74    Ca    8.9      15 Oct 2017 05:56    hypoxia secondary to multilobar airspace disease, atelectasis and small b/l pleural effusions.  -chest CT reviewed with Pulmonology.  -given Lasix 20mg IV x 1 earlier.  -c/w supplemental O2 + JULEE neb PRN + incentive spirometery.  -Pulmonary following.    multifocal pneumonia w/ sepsis upon admission.    -resistant and gram negative organisms are possible.  -BCx, NTD.  -c/w Ceftin 250mg po q12h x 3 more days.  -speech pathology input noted.  regular consistency diet recommended.    hx AF.  -rate controlled.  -c/w apixaban.  -c/w metoprolol.    hx dementia.  -c/w donepezil + duloxetine + memantine.    disposition.  -document RA O2 saturation at rest and upon ambulation tomorrow.  -10/12 PT recommendations was d/c home w/ home PT.  -may need to d/c back to [facility] w/ home O2.  -attestation form for [facility] completed and placed on chart.    communication.  -d/w RN.  -d/w patient's daughter.

## 2017-10-15 NOTE — PROGRESS NOTE ADULT - SUBJECTIVE AND OBJECTIVE BOX
SUBJECTIVE     Patient breathing comfortably and ct findings noted . she offers no complaints. On 2lit by the nasal canula .         PAST MEDICAL & SURGICAL HISTORY:  dementia   afib           OBJECTIVE       Vital Signs Last 24 Hrs  T(C): 36.9 (15 Oct 2017 04:57), Max: 37.4 (14 Oct 2017 10:03)  T(F): 98.4 (15 Oct 2017 04:57), Max: 99.3 (14 Oct 2017 10:03)  HR: 100 (15 Oct 2017 04:57) (81 - 113)  BP: 132/74 (15 Oct 2017 04:57) (101/60 - 164/69)  BP(mean): --  RR: 17 (15 Oct 2017 04:57) (16 - 17)  SpO2: 99% (15 Oct 2017 04:57) (96% - 100%)    PHYSICAL EXAM:    Constitutional: , awake and alert, not in distress.     HEENT: Normo cephalic atruamtic     Neck: Soft and supple, No J.V.D     Respiratory: vesicular breathing she has rales over the right base. she has decreased breath sounds in the left base.    Cardiovascular: S1 and S2, regular rate .     Gastrointestinal:  soft, nontender,     Extremities: No peripheral edema    Neurological: A/O x 3, no focal deficits and has baseline dementia .      Medications:  MEDICATIONS  (STANDING):  apixaban 2.5 milliGRAM(s) Oral every 12 hours  cefuroxime   Tablet 250 milliGRAM(s) Oral every 12 hours  cholecalciferol 1000 Unit(s) Oral daily  cyanocobalamin 1000 MICROGram(s) Oral daily  docusate sodium 100 milliGRAM(s) Oral at bedtime  donepezil 5 milliGRAM(s) Oral daily  DULoxetine 60 milliGRAM(s) Oral at bedtime  furosemide   Injectable 20 milliGRAM(s) IV Push once  memantine 10 milliGRAM(s) Oral two times a day  metoprolol succinate ER 25 milliGRAM(s) Oral daily  simvastatin 40 milliGRAM(s) Oral at bedtime      Labs: All Labs Reviewed:                        11.3   8.7   )-----------( 208      ( 15 Oct 2017 05:56 )             34.6     10-15    139  |  104  |  9   ----------------------------<  80  3.7   |  28  |  0.74    Ca    8.9      15 Oct 2017 05:56

## 2017-10-16 ENCOUNTER — TRANSCRIPTION ENCOUNTER (OUTPATIENT)
Age: 82
End: 2017-10-16

## 2017-10-16 VITALS — DIASTOLIC BLOOD PRESSURE: 50 MMHG | HEART RATE: 86 BPM | SYSTOLIC BLOOD PRESSURE: 106 MMHG | OXYGEN SATURATION: 94 %

## 2017-10-16 LAB
CULTURE RESULTS: SIGNIFICANT CHANGE UP
CULTURE RESULTS: SIGNIFICANT CHANGE UP
SPECIMEN SOURCE: SIGNIFICANT CHANGE UP
SPECIMEN SOURCE: SIGNIFICANT CHANGE UP

## 2017-10-16 RX ORDER — CEFUROXIME AXETIL 250 MG
1 TABLET ORAL
Qty: 14 | Refills: 0 | OUTPATIENT
Start: 2017-10-16 | End: 2017-10-23

## 2017-10-16 RX ADMIN — DONEPEZIL HYDROCHLORIDE 5 MILLIGRAM(S): 10 TABLET, FILM COATED ORAL at 11:24

## 2017-10-16 RX ADMIN — Medication 1000 UNIT(S): at 11:25

## 2017-10-16 RX ADMIN — APIXABAN 2.5 MILLIGRAM(S): 2.5 TABLET, FILM COATED ORAL at 05:07

## 2017-10-16 RX ADMIN — Medication 250 MILLIGRAM(S): at 05:07

## 2017-10-16 RX ADMIN — MEMANTINE HYDROCHLORIDE 10 MILLIGRAM(S): 10 TABLET ORAL at 05:07

## 2017-10-16 RX ADMIN — Medication 25 MILLIGRAM(S): at 05:07

## 2017-10-16 RX ADMIN — PREGABALIN 1000 MICROGRAM(S): 225 CAPSULE ORAL at 11:25

## 2017-10-16 NOTE — DISCHARGE NOTE ADULT - PATIENT PORTAL LINK FT
“You can access the [employer] Patient Portal, offered by [facility], by registering with the following website: [email or web address]”

## 2017-10-16 NOTE — PROGRESS NOTE ADULT - ASSESSMENT
89 y/o female with h/o dementia, HTN, HLD, A.Fib, spinal stenosis, depression, anxiety disorder was admitted on 10/10 with shortness of breath for 1-2 days. Pt was sent from [facility] due to lethargy and recent URI symptoms. Pt denied any sore throat. Pt is poor historian due to dementia. In ER, she was noted febrile to 102.8F and received cefepime.    1. Febrile syndrome improving. Possible sepsis. Multifocal pneumonia. Possible aspiration pneumonia.   -Encephalopathy improving  -leukocytosis improving  -f/u BC x 2  -on cefepime 1 gm IV q12h and azithromycin 500 mg PO qd # 2  -tolerating abx well so far; no side effects noted  -continue abx coverage  -aspiration precautions  -monitor temps  -f/u CBC  -supportive care  2. Other issues:   -care per medicine
Patient is seen and consult dictated     - hypoxia with ambulation with decreased breath sounds in the left side  rule out atelectasis or effusion less likely P.E with the intake of eloquis   - status post treatment for pneumonia with bilateral lung infiltrates with resolved fever and improved leucocytosis.     - dementia   - afib   - HTN       PLAN     agree with the ct scan of the chest done for the better evaluation.   for now continue with the antibiotics and 02 supplementation and would review the ct scan of the chest .
patient ct note that shows small effusion in the left side with the associated atelectasis and consolidation with the differential of effusion with fluid load or parapneumonic effusion and effusion is small to drain .   recommend trial of diuretics in low dose to see whether it improves hypoxia   incentive spirometry   continue to monitor for the effusion   patient might need 02 at the time of the discharge
- hypoxia with ambulation secondary to pneumonia with associated atelectasis with small left effusion with the differential of parapneumonia versus effusions related with the mild diastolic dysfunction . less clinical suspicion for the pulmonary embolism .   - dementia   - afib   - HTN       PLAN     continue with the ceftin   would one dose of diuretic lasix today   incentive spirometry   might need home 02 at discharge if continued to hypoxemic
- hypoxia with ambulation secondary to pneumonia with associated atelectasis with small left effusion with the differential of parapneumonia versus effusions related with the mild diastolic dysfunction . less clinical suspicion for the pulmonary embolism .   - dementia   - afib   - HTN       PLAN     continue with the ceftin and total duration of antibiotics will be for 10 days   incentive spirometry   recheck 02 on room air and with ambulation for the discharge planning   need to have repeat cxr in 2 weeks while in facility for the improvement in pneumonia and atelectasis and effusion
89 y/o female with h/o dementia, HTN, HLD, A.Fib, spinal stenosis, depression, anxiety disorder was admitted on 10/10 with shortness of breath for 1-2 days. Pt was sent from [facility] due to lethargy and recent URI symptoms. Pt denied any sore throat. Pt is poor historian due to dementia. In ER, she was noted febrile to 102.8F and received cefepime.    1. Febrile syndrome improving. Possible sepsis. Multifocal pneumonia. Possible aspiration pneumonia.   -Encephalopathy improved; but seems more delirious  -f/u BC x 2  -on cefepime 1 gm IV q12h and azithromycin 500 mg PO qd # 3  -tolerating abx well so far; no side effects noted  -may change cefepime to 250 mg PO q12h for 5 more days and continue azithro for 5 days  -aspiration precautions  -monitor temps  -f/u CBC  -supportive care  2. Other issues:   -care per medicine

## 2017-10-16 NOTE — DISCHARGE NOTE ADULT - HOSPITAL COURSE
89F.  presents to ED w/ SOB.  [facility] resident ([place]).  lethargy.  recent URI.    PMHx:  dementia;  HTN;  hyperlipidemia;  AF (E);  spinal stenosis;  depression/anxiety.    10/15-breathing comfortably at rest.      ROS:  (+) minimal cough.    well appearing elderly wf.  NCAT.  neck supple.  lungs (+) faint b/l rale.  diminished breath sounds.  heart RRR.  NS1S2.  no MRG.  abd soft.  NT.  ND.  nml BS.  ext no edema.      hypoxia secondary to multilobar airspace disease, atelectasis and small b/l pleural effusions.  -resolved, sec to PNA    multifocal pneumonia w/ sepsis upon admission.    -resistant and gram negative organisms are possible.  -BCx, NTD.  -c/w Ceftin 250mg po q12h x      hx AF.  -rate controlled.  -c/w apixaban.  -c/w metoprolol.    hx dementia.  -c/w donepezil + duloxetine + memantine.

## 2017-10-16 NOTE — DISCHARGE NOTE ADULT - CARE PROVIDER_API CALL
Karlee Montilla), Medicine  [address] [place] [address]  Phone: [phone]  Fax: [phone]

## 2017-10-16 NOTE — DISCHARGE NOTE ADULT - CARE PLAN
Principal Discharge DX:	Pneumonia  Goal:	resolving  Instructions for follow-up, activity and diet:	same as before

## 2017-10-16 NOTE — DISCHARGE NOTE ADULT - MEDICATION SUMMARY - MEDICATIONS TO TAKE
I will START or STAY ON the medications listed below when I get home from the hospital:    ibuprofen 200 mg oral tablet  -- 2 tab(s) by mouth every 6 hours, As Needed  -- Indication: For .    Percocet 5/325 oral tablet  -- 0.5 tab(s) by mouth once a day  -- Indication: For .    Percocet 5/325 oral tablet  -- 1 tab(s) by mouth once a day (at bedtime)  -- Indication: For .    Eliquis 2.5 mg oral tablet  -- 1 tab(s) by mouth 2 times a day  -- Indication: For .    DULoxetine 60 mg oral delayed release capsule  -- 1 cap(s) by mouth once a day (at bedtime)  -- Indication: For .    simvastatin 40 mg oral tablet  -- 1 tab(s) by mouth once a day (at bedtime)  -- Indication: For .    Toprol-XL 25 mg oral tablet, extended release  -- 1 tab(s) by mouth once a day  -- Indication: For .    cefuroxime 250 mg oral tablet  -- 1 tab(s) by mouth every 12 hours  -- Indication: For .    donepezil 5 mg oral tablet  -- 1 tab(s) by mouth once a day  -- Indication: For .    Colace 100 mg oral capsule  -- 1 cap(s) by mouth once a day (at bedtime)  -- Indication: For .    Namenda XR 21 mg oral capsule, extended release  -- 1 cap(s) by mouth once a day (at bedtime)  -- Indication: For .    Vitamin D3 1000 intl units oral tablet  -- 1 tab(s) by mouth once a day  -- Indication: For ..    Vitamin B12 1000 mcg oral tablet  -- 1 tab(s) by mouth once a day  -- Indication: For .

## 2017-10-16 NOTE — PROGRESS NOTE ADULT - SUBJECTIVE AND OBJECTIVE BOX
SUBJECTIVE     patient seen sitting in chair and offers no new complaints . she is not wearing the 02         PAST MEDICAL & SURGICAL HISTORY:  dementia   afib           OBJECTIVE       Vital Signs Last 24 Hrs  T(C): 36.7 (16 Oct 2017 05:01), Max: 37 (15 Oct 2017 16:58)  T(F): 98.1 (16 Oct 2017 05:01), Max: 98.6 (15 Oct 2017 16:58)  HR: 95 (16 Oct 2017 05:01) (95 - 116)  BP: 119/72 (16 Oct 2017 05:01) (114/73 - 136/68)  BP(mean): --  RR: 18 (15 Oct 2017 16:58) (18 - 18)  SpO2: 100% (16 Oct 2017 05:01) (95% - 100%)    PHYSICAL EXAM:    Constitutional: , awake and alert, not in distress.     HEENT: Normo cephalic atruamtic     Neck: Soft and supple, No J.V.D     Respiratory: vesicular breathing she has rales over the right base and breath sounds seems to be some what improved in the left base .     Cardiovascular: S1 and S2, regular rate .     Gastrointestinal:  soft, nontender,     Extremities: No peripheral edema    Neurological: A/O x 3, no focal deficits and has baseline dementia .      Medications:  MEDICATIONS  (STANDING):  apixaban 2.5 milliGRAM(s) Oral every 12 hours  cefuroxime   Tablet 250 milliGRAM(s) Oral every 12 hours  cholecalciferol 1000 Unit(s) Oral daily  cyanocobalamin 1000 MICROGram(s) Oral daily  docusate sodium 100 milliGRAM(s) Oral at bedtime  donepezil 5 milliGRAM(s) Oral daily  DULoxetine 60 milliGRAM(s) Oral at bedtime  furosemide   Injectable 20 milliGRAM(s) IV Push once  memantine 10 milliGRAM(s) Oral two times a day  metoprolol succinate ER 25 milliGRAM(s) Oral daily  simvastatin 40 milliGRAM(s) Oral at bedtime                            11.3   8.7   )-----------( 208      ( 15 Oct 2017 05:56 )             34.6   10-15    139  |  104  |  9   ----------------------------<  80  3.7   |  28  |  0.74    Ca    8.9      15 Oct 2017 05:56

## 2017-10-16 NOTE — PROGRESS NOTE ADULT - PROVIDER SPECIALTY LIST ADULT
Hospitalist
Infectious Disease
Pulmonology
Infectious Disease

## 2017-10-19 DIAGNOSIS — F41.9 ANXIETY DISORDER, UNSPECIFIED: ICD-10-CM

## 2017-10-19 DIAGNOSIS — J18.9 PNEUMONIA, UNSPECIFIED ORGANISM: ICD-10-CM

## 2017-10-19 DIAGNOSIS — F32.9 MAJOR DEPRESSIVE DISORDER, SINGLE EPISODE, UNSPECIFIED: ICD-10-CM

## 2017-10-19 DIAGNOSIS — E78.5 HYPERLIPIDEMIA, UNSPECIFIED: ICD-10-CM

## 2017-10-19 DIAGNOSIS — A41.9 SEPSIS, UNSPECIFIED ORGANISM: ICD-10-CM

## 2017-10-19 DIAGNOSIS — I10 ESSENTIAL (PRIMARY) HYPERTENSION: ICD-10-CM

## 2017-10-19 DIAGNOSIS — I48.91 UNSPECIFIED ATRIAL FIBRILLATION: ICD-10-CM

## 2017-10-19 DIAGNOSIS — G93.40 ENCEPHALOPATHY, UNSPECIFIED: ICD-10-CM

## 2017-10-19 DIAGNOSIS — F03.90 UNSPECIFIED DEMENTIA WITHOUT BEHAVIORAL DISTURBANCE: ICD-10-CM

## 2017-10-19 DIAGNOSIS — R09.02 HYPOXEMIA: ICD-10-CM

## 2017-10-19 DIAGNOSIS — D72.829 ELEVATED WHITE BLOOD CELL COUNT, UNSPECIFIED: ICD-10-CM

## 2017-12-29 ENCOUNTER — EMERGENCY (EMERGENCY)
Facility: HOSPITAL | Age: 82
LOS: 0 days | Discharge: HOME | End: 2017-12-29
Attending: EMERGENCY MEDICINE | Admitting: EMERGENCY MEDICINE
Payer: MEDICARE

## 2017-12-29 VITALS
DIASTOLIC BLOOD PRESSURE: 74 MMHG | HEART RATE: 84 BPM | TEMPERATURE: 98 F | OXYGEN SATURATION: 98 % | RESPIRATION RATE: 16 BRPM | SYSTOLIC BLOOD PRESSURE: 144 MMHG

## 2017-12-29 VITALS
TEMPERATURE: 98 F | OXYGEN SATURATION: 98 % | SYSTOLIC BLOOD PRESSURE: 157 MMHG | HEART RATE: 82 BPM | DIASTOLIC BLOOD PRESSURE: 86 MMHG | RESPIRATION RATE: 16 BRPM
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
## 2017-12-29 DIAGNOSIS — S09.90XA UNSPECIFIED INJURY OF HEAD, INITIAL ENCOUNTER: ICD-10-CM
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
## 2017-12-29 PROCEDURE — 70450 CT HEAD/BRAIN W/O DYE: CPT | Mod: 26

## 2017-12-29 PROCEDURE — 99285 EMERGENCY DEPT VISIT HI MDM: CPT

## 2017-12-29 PROCEDURE — 72125 CT NECK SPINE W/O DYE: CPT | Mod: 26

## 2017-12-29 PROCEDURE — 93010 ELECTROCARDIOGRAM REPORT: CPT

## 2017-12-29 NOTE — ED ADULT NURSE NOTE - CHIEF COMPLAINT QUOTE
c/o fall. unwitnessed, from assisted living. Per daughter, patient was found on the floor awake and alert, but unknown if LOC. Trauma alert called at triage. Patient on Eliquis. Bruise noted to left side of forehead..

## 2017-12-29 NOTE — ED ADULT NURSE NOTE - OBJECTIVE STATEMENT
Pt fell and hit her head ? LOC, states she feels fine now and doesn't know why she came to the hospital.

## 2017-12-29 NOTE — ED PROVIDER NOTE - SKIN, MLM
Skin normal color for race, warm, dry. No evidence of rash. +left frontal forehead abrasion. No lac.

## 2017-12-29 NOTE — ED ADULT TRIAGE NOTE - CHIEF COMPLAINT QUOTE
c/o fall. unwitnessed, from assisted living. Per daughter, patient was found on the floor awake and alert, but unknown if LOC. Trauma alert called at triage. Patient on Eliquis. Bruise noted to left side of forehead. c/o fall. unwitnessed, from assisted living. Per daughter, patient was found on the floor awake and alert, but unknown if LOC. Trauma alert called at triage. Patient on Eliquis. Bruise noted to left side of forehead..

## 2017-12-29 NOTE — ED PROVIDER NOTE - OBJECTIVE STATEMENT
89 y/o female with PMHx of AFib presents to the ED from the [facility] s/p fall. When staff went to pick pt up for lunch, pt had fallen and was found on the floor next to a table. No LOC. Did hit her head. Daughter states pt is acting at baseline. No complaints at this time.

## 2018-02-02 ENCOUNTER — EMERGENCY (EMERGENCY)
Facility: HOSPITAL | Age: 83
LOS: 0 days | Discharge: HOME | End: 2018-02-02
Attending: EMERGENCY MEDICINE | Admitting: EMERGENCY MEDICINE
Payer: MEDICARE

## 2018-02-02 VITALS
WEIGHT: 145.06 LBS | HEART RATE: 89 BPM | RESPIRATION RATE: 16 BRPM | SYSTOLIC BLOOD PRESSURE: 156 MMHG | HEIGHT: 60 IN | OXYGEN SATURATION: 100 % | TEMPERATURE: 98 F | DIASTOLIC BLOOD PRESSURE: 92 MMHG

## 2018-02-02 VITALS
OXYGEN SATURATION: 100 % | SYSTOLIC BLOOD PRESSURE: 161 MMHG | HEART RATE: 90 BPM | TEMPERATURE: 98 F | RESPIRATION RATE: 18 BRPM | DIASTOLIC BLOOD PRESSURE: 90 MMHG

## 2018-02-02 DIAGNOSIS — S09.90XA UNSPECIFIED INJURY OF HEAD, INITIAL ENCOUNTER: ICD-10-CM
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
## 2018-02-02 DIAGNOSIS — F03.90 UNSPECIFIED DEMENTIA WITHOUT BEHAVIORAL DISTURBANCE: ICD-10-CM

## 2018-02-02 DIAGNOSIS — I48.91 UNSPECIFIED ATRIAL FIBRILLATION: ICD-10-CM
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
## 2018-02-02 DIAGNOSIS — Z79.01 LONG TERM (CURRENT) USE OF ANTICOAGULANTS: ICD-10-CM

## 2018-02-02 DIAGNOSIS — M48.00 SPINAL STENOSIS, SITE UNSPECIFIED: ICD-10-CM
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
## 2018-02-02 DIAGNOSIS — I10 ESSENTIAL (PRIMARY) HYPERTENSION: ICD-10-CM

## 2018-02-02 LAB
ALBUMIN SERPL ELPH-MCNC: 3.6 G/DL — SIGNIFICANT CHANGE UP (ref 3.3–5)
ALP SERPL-CCNC: 83 U/L — SIGNIFICANT CHANGE UP (ref 40–120)
ALT FLD-CCNC: 16 U/L — SIGNIFICANT CHANGE UP (ref 12–78)
ANION GAP SERPL CALC-SCNC: 7 MMOL/L — SIGNIFICANT CHANGE UP (ref 5–17)
APTT BLD: 30.8 SEC — SIGNIFICANT CHANGE UP (ref 27.5–37.4)
AST SERPL-CCNC: 21 U/L — SIGNIFICANT CHANGE UP (ref 15–37)
BASOPHILS # BLD AUTO: 0.1 K/UL — SIGNIFICANT CHANGE UP (ref 0–0.2)
BASOPHILS NFR BLD AUTO: 0.8 % — SIGNIFICANT CHANGE UP (ref 0–2)
BILIRUB SERPL-MCNC: 0.4 MG/DL — SIGNIFICANT CHANGE UP (ref 0.2–1.2)
BUN SERPL-MCNC: 19 MG/DL — SIGNIFICANT CHANGE UP (ref 7–23)
CALCIUM SERPL-MCNC: 9.3 MG/DL — SIGNIFICANT CHANGE UP (ref 8.5–10.1)
CHLORIDE SERPL-SCNC: 106 MMOL/L — SIGNIFICANT CHANGE UP (ref 96–108)
CO2 SERPL-SCNC: 28 MMOL/L — SIGNIFICANT CHANGE UP (ref 22–31)
CREAT SERPL-MCNC: 0.82 MG/DL — SIGNIFICANT CHANGE UP (ref 0.5–1.3)
EOSINOPHIL # BLD AUTO: 0.1 K/UL — SIGNIFICANT CHANGE UP (ref 0–0.5)
EOSINOPHIL NFR BLD AUTO: 1.7 % — SIGNIFICANT CHANGE UP (ref 0–6)
GLUCOSE SERPL-MCNC: 87 MG/DL — SIGNIFICANT CHANGE UP (ref 70–99)
HCT VFR BLD CALC: 45 % — SIGNIFICANT CHANGE UP (ref 34.5–45)
HGB BLD-MCNC: 14.6 G/DL — SIGNIFICANT CHANGE UP (ref 11.5–15.5)
INR BLD: 1.11 RATIO — SIGNIFICANT CHANGE UP (ref 0.88–1.16)
LYMPHOCYTES # BLD AUTO: 2.7 K/UL — SIGNIFICANT CHANGE UP (ref 1–3.3)
LYMPHOCYTES # BLD AUTO: 32.7 % — SIGNIFICANT CHANGE UP (ref 13–44)
MCHC RBC-ENTMCNC: 31.1 PG — SIGNIFICANT CHANGE UP (ref 27–34)
MCHC RBC-ENTMCNC: 32.5 GM/DL — SIGNIFICANT CHANGE UP (ref 32–36)
MCV RBC AUTO: 95.8 FL — SIGNIFICANT CHANGE UP (ref 80–100)
MONOCYTES # BLD AUTO: 0.6 K/UL — SIGNIFICANT CHANGE UP (ref 0–0.9)
MONOCYTES NFR BLD AUTO: 7.9 % — SIGNIFICANT CHANGE UP (ref 2–14)
NEUTROPHILS # BLD AUTO: 4.7 K/UL — SIGNIFICANT CHANGE UP (ref 1.8–7.4)
NEUTROPHILS NFR BLD AUTO: 56.8 % — SIGNIFICANT CHANGE UP (ref 43–77)
PLATELET # BLD AUTO: 194 K/UL — SIGNIFICANT CHANGE UP (ref 150–400)
POTASSIUM SERPL-MCNC: 3.7 MMOL/L — SIGNIFICANT CHANGE UP (ref 3.5–5.3)
POTASSIUM SERPL-SCNC: 3.7 MMOL/L — SIGNIFICANT CHANGE UP (ref 3.5–5.3)
PROT SERPL-MCNC: 6.9 GM/DL — SIGNIFICANT CHANGE UP (ref 6–8.3)
PROTHROM AB SERPL-ACNC: 12 SEC — SIGNIFICANT CHANGE UP (ref 9.8–12.7)
RBC # BLD: 4.7 M/UL — SIGNIFICANT CHANGE UP (ref 3.8–5.2)
RBC # FLD: 13.6 % — SIGNIFICANT CHANGE UP (ref 10.3–14.5)
SODIUM SERPL-SCNC: 141 MMOL/L — SIGNIFICANT CHANGE UP (ref 135–145)
WBC # BLD: 8.2 K/UL — SIGNIFICANT CHANGE UP (ref 3.8–10.5)
WBC # FLD AUTO: 8.2 K/UL — SIGNIFICANT CHANGE UP (ref 3.8–10.5)

## 2018-02-02 PROCEDURE — 71045 X-RAY EXAM CHEST 1 VIEW: CPT | Mod: 26

## 2018-02-02 PROCEDURE — 72190 X-RAY EXAM OF PELVIS: CPT | Mod: 26

## 2018-02-02 PROCEDURE — 70450 CT HEAD/BRAIN W/O DYE: CPT | Mod: 26

## 2018-02-02 PROCEDURE — 72125 CT NECK SPINE W/O DYE: CPT | Mod: 26

## 2018-02-02 PROCEDURE — 99285 EMERGENCY DEPT VISIT HI MDM: CPT

## 2018-02-02 PROCEDURE — 93010 ELECTROCARDIOGRAM REPORT: CPT

## 2018-02-02 NOTE — ED PROVIDER NOTE - CARE PLAN
Principal Discharge DX:	Minor head injury, initial encounter  Secondary Diagnosis:	Fall, initial encounter  Secondary Diagnosis:	Dementia

## 2018-02-02 NOTE — ED PROVIDER NOTE - PROGRESS NOTE DETAILS
Dr. Ohara:  Pt remains in good comfort, no c/o's while in ED.  + Ambulatory w/ walker, no pain nor grossly unsteady. CXR negative.  Pt TBD.

## 2018-02-02 NOTE — ED PROVIDER NOTE - CONSTITUTIONAL, MLM
normal... Elderly WF, well nourished, awake, alert, oriented to person, place, time/situation and in no apparent distress.

## 2018-02-02 NOTE — ED PROVIDER NOTE - ENMT, MLM
Airway patent, Nasal mucosa clear. Mouth with normal mucosa. Throat has no vesicles, no oropharyngeal exudates and uvula is midline. Neck non-tender, AFROM without pain NC/AT.  Airway patent, Nasal mucosa clear. Mouth with normal mucosa. Throat has no vesicles, no oropharyngeal exudates and uvula is midline. Neck non-tender, AFROM without pain

## 2018-02-02 NOTE — ED PROVIDER NOTE - DIAGNOSIS COUNSELING, MDM
conducted a detailed discussion... I had a detailed discussion with the patient and daughter/guardian regarding the historical points, exam findings, and any diagnostic results supporting the discharge diagnosis.

## 2018-02-02 NOTE — ED ADULT NURSE NOTE - CHPI ED SYMPTOMS NEG
no fever/no loss of consciousness/no numbness/no tingling/no weakness/no vomiting/no confusion/no deformity/no bleeding

## 2018-02-02 NOTE — ED PROVIDER NOTE - MUSCULOSKELETAL, MLM
Spine appears normal, range of motion is not limited, no muscle or joint tenderness; no chest wall tenderness, no CVA tenderness, MAEx4

## 2018-02-02 NOTE — ED ADULT TRIAGE NOTE - CHIEF COMPLAINT QUOTE
Pt. to the ED BIB[facility] from [facility] S/P Unwitnessed fall. + Head Injury , Unknown LOC- Pt. on Eliquis , HX of dementia - Pt. pedro historian- BERNICE called by EMS RN @ 1112 am

## 2018-10-29 ENCOUNTER — APPOINTMENT (OUTPATIENT)
Dept: DERMATOLOGY | Facility: CLINIC | Age: 83
End: 2018-10-29
Payer: MEDICARE

## 2018-10-29 VITALS — BODY MASS INDEX: 27.48 KG/M2 | WEIGHT: 140 LBS | HEIGHT: 60 IN

## 2018-10-29 DIAGNOSIS — Z86.79 PERSONAL HISTORY OF OTHER DISEASES OF THE CIRCULATORY SYSTEM: ICD-10-CM

## 2018-10-29 DIAGNOSIS — Z86.69 PERSONAL HISTORY OF OTHER DISEASES OF THE NERVOUS SYSTEM AND SENSE ORGANS: ICD-10-CM

## 2018-10-29 DIAGNOSIS — H54.7 UNSPECIFIED VISUAL LOSS: ICD-10-CM

## 2018-10-29 DIAGNOSIS — F41.9 ANXIETY DISORDER, UNSPECIFIED: ICD-10-CM

## 2018-10-29 DIAGNOSIS — F32.9 ANXIETY DISORDER, UNSPECIFIED: ICD-10-CM

## 2018-10-29 DIAGNOSIS — Z86.59 PERSONAL HISTORY OF OTHER MENTAL AND BEHAVIORAL DISORDERS: ICD-10-CM

## 2018-10-29 DIAGNOSIS — Z80.8 FAMILY HISTORY OF MALIGNANT NEOPLASM OF OTHER ORGANS OR SYSTEMS: ICD-10-CM

## 2018-10-29 DIAGNOSIS — Z87.39 PERSONAL HISTORY OF OTHER DISEASES OF THE MUSCULOSKELETAL SYSTEM AND CONNECTIVE TISSUE: ICD-10-CM

## 2018-10-29 DIAGNOSIS — D17.9 BENIGN LIPOMATOUS NEOPLASM, UNSPECIFIED: ICD-10-CM

## 2018-10-29 DIAGNOSIS — L81.4 OTHER MELANIN HYPERPIGMENTATION: ICD-10-CM

## 2018-10-29 DIAGNOSIS — Z84.0 FAMILY HISTORY OF DISEASES OF THE SKIN AND SUBCUTANEOUS TISSUE: ICD-10-CM

## 2018-10-29 DIAGNOSIS — Z80.9 FAMILY HISTORY OF MALIGNANT NEOPLASM, UNSPECIFIED: ICD-10-CM

## 2018-10-29 PROCEDURE — 99203 OFFICE O/P NEW LOW 30 MIN: CPT

## 2018-10-29 RX ORDER — ACETAMINOPHEN 325 MG/1
TABLET, FILM COATED ORAL
Refills: 0 | Status: ACTIVE | COMMUNITY

## 2018-10-29 RX ORDER — OXYCODONE HYDROCHLORIDE AND ACETAMINOPHEN 5; 325 MG/1; MG/1
5-325 TABLET ORAL
Refills: 0 | Status: ACTIVE | COMMUNITY

## 2018-10-29 RX ORDER — LISINOPRIL 5 MG/1
5 TABLET ORAL
Refills: 0 | Status: ACTIVE | COMMUNITY

## 2018-10-29 RX ORDER — APIXABAN 5 MG/1
TABLET, FILM COATED ORAL
Refills: 0 | Status: ACTIVE | COMMUNITY

## 2018-10-29 RX ORDER — MEMANTINE HYDROCHLORIDE 21 MG/1
21 CAPSULE, EXTENDED RELEASE ORAL
Refills: 0 | Status: ACTIVE | COMMUNITY

## 2018-10-29 RX ORDER — DULOXETINE HYDROCHLORIDE 60 MG/1
60 CAPSULE, DELAYED RELEASE ORAL
Refills: 0 | Status: ACTIVE | COMMUNITY

## 2018-10-29 RX ORDER — SIMVASTATIN 40 MG/1
40 TABLET, FILM COATED ORAL
Refills: 0 | Status: ACTIVE | COMMUNITY

## 2018-10-29 RX ORDER — DONEPEZIL HYDROCHLORIDE 5 MG/1
5 TABLET, FILM COATED ORAL
Refills: 0 | Status: ACTIVE | COMMUNITY

## 2019-01-01 NOTE — ED ADULT NURSE REASSESSMENT NOTE - GENERAL PATIENT STATE
Spoke with mom. Mom states patient has been constipated - didn't have BM for 2 days. Looks like he's pushing hard and his face turns red - he seems uncomfortable. He is strictly formula fed. He was on Similac Pro Advance Formula, but was switched to Similac Sensitive 2 days ago. Mom states he did have a small BM today, but still thinks he's straining. Informed mom that she can continue to give the Similac Sensitive formula and see if he improves in the next week or so. If there is no improvement, then she should schedule an appt to be seen by a provider and discuss formula changes. Recommended mom try Enfamil Gentlease and that we have samples we can provide her when she's in the office. Mom agrees with plan and verbalized understanding.     comfortable appearance/smiling/interactive/cooperative/family/SO at bedside

## 2019-01-26 ENCOUNTER — EMERGENCY (EMERGENCY)
Facility: HOSPITAL | Age: 84
LOS: 0 days | Discharge: HOME | End: 2019-01-27
Attending: EMERGENCY MEDICINE | Admitting: EMERGENCY MEDICINE
Payer: MEDICARE

## 2019-01-26 VITALS
SYSTOLIC BLOOD PRESSURE: 146 MMHG | WEIGHT: 119.93 LBS | DIASTOLIC BLOOD PRESSURE: 73 MMHG | HEIGHT: 61 IN | TEMPERATURE: 97 F | RESPIRATION RATE: 18 BRPM | OXYGEN SATURATION: 100 % | HEART RATE: 96 BPM

## 2019-01-26 DIAGNOSIS — I10 ESSENTIAL (PRIMARY) HYPERTENSION: ICD-10-CM

## 2019-01-26 DIAGNOSIS — S00.83XA CONTUSION OF OTHER PART OF HEAD, INITIAL ENCOUNTER: ICD-10-CM

## 2019-01-26 DIAGNOSIS — I48.91 UNSPECIFIED ATRIAL FIBRILLATION: ICD-10-CM

## 2019-01-26 DIAGNOSIS — Z79.1 LONG TERM (CURRENT) USE OF NON-STEROIDAL ANTI-INFLAMMATORIES (NSAID): ICD-10-CM

## 2019-01-26 DIAGNOSIS — F03.90 UNSPECIFIED DEMENTIA WITHOUT BEHAVIORAL DISTURBANCE: ICD-10-CM

## 2019-01-26 DIAGNOSIS — Z79.01 LONG TERM (CURRENT) USE OF ANTICOAGULANTS: ICD-10-CM

## 2019-01-26 DIAGNOSIS — M47.892 OTHER SPONDYLOSIS, CERVICAL REGION: ICD-10-CM

## 2019-01-26 DIAGNOSIS — H11.32 CONJUNCTIVAL HEMORRHAGE, LEFT EYE: ICD-10-CM
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
## 2019-01-26 DIAGNOSIS — S00.12XA CONTUSION OF LEFT EYELID AND PERIOCULAR AREA, INITIAL ENCOUNTER: ICD-10-CM
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
## 2019-01-26 LAB
BASOPHILS # BLD AUTO: 0.02 K/UL — SIGNIFICANT CHANGE UP (ref 0–0.2)
BASOPHILS NFR BLD AUTO: 0.3 % — SIGNIFICANT CHANGE UP (ref 0–2)
EOSINOPHIL # BLD AUTO: 0.12 K/UL — SIGNIFICANT CHANGE UP (ref 0–0.5)
EOSINOPHIL NFR BLD AUTO: 1.8 % — SIGNIFICANT CHANGE UP (ref 0–6)
HCT VFR BLD CALC: 39.9 % — SIGNIFICANT CHANGE UP (ref 34.5–45)
HGB BLD-MCNC: 13 G/DL — SIGNIFICANT CHANGE UP (ref 11.5–15.5)
IMM GRANULOCYTES NFR BLD AUTO: 0.2 % — SIGNIFICANT CHANGE UP (ref 0–1.5)
LYMPHOCYTES # BLD AUTO: 1.83 K/UL — SIGNIFICANT CHANGE UP (ref 1–3.3)
LYMPHOCYTES # BLD AUTO: 27.8 % — SIGNIFICANT CHANGE UP (ref 13–44)
MCHC RBC-ENTMCNC: 32.3 PG — SIGNIFICANT CHANGE UP (ref 27–34)
MCHC RBC-ENTMCNC: 32.6 GM/DL — SIGNIFICANT CHANGE UP (ref 32–36)
MCV RBC AUTO: 99 FL — SIGNIFICANT CHANGE UP (ref 80–100)
MONOCYTES # BLD AUTO: 0.59 K/UL — SIGNIFICANT CHANGE UP (ref 0–0.9)
MONOCYTES NFR BLD AUTO: 9 % — SIGNIFICANT CHANGE UP (ref 2–14)
NEUTROPHILS # BLD AUTO: 4.02 K/UL — SIGNIFICANT CHANGE UP (ref 1.8–7.4)
NEUTROPHILS NFR BLD AUTO: 60.9 % — SIGNIFICANT CHANGE UP (ref 43–77)
NRBC # BLD: 0 /100 WBCS — SIGNIFICANT CHANGE UP (ref 0–0)
PLATELET # BLD AUTO: 168 K/UL — SIGNIFICANT CHANGE UP (ref 150–400)
RBC # BLD: 4.03 M/UL — SIGNIFICANT CHANGE UP (ref 3.8–5.2)
RBC # FLD: 14.4 % — SIGNIFICANT CHANGE UP (ref 10.3–14.5)
WBC # BLD: 6.59 K/UL — SIGNIFICANT CHANGE UP (ref 3.8–10.5)
WBC # FLD AUTO: 6.59 K/UL — SIGNIFICANT CHANGE UP (ref 3.8–10.5)

## 2019-01-26 PROCEDURE — 74176 CT ABD & PELVIS W/O CONTRAST: CPT | Mod: 26

## 2019-01-26 PROCEDURE — 99284 EMERGENCY DEPT VISIT MOD MDM: CPT | Mod: 25

## 2019-01-26 PROCEDURE — 93010 ELECTROCARDIOGRAM REPORT: CPT

## 2019-01-26 PROCEDURE — 70486 CT MAXILLOFACIAL W/O DYE: CPT | Mod: 26

## 2019-01-26 PROCEDURE — 72125 CT NECK SPINE W/O DYE: CPT | Mod: 26

## 2019-01-26 PROCEDURE — 71250 CT THORAX DX C-: CPT | Mod: 26

## 2019-01-26 PROCEDURE — 70450 CT HEAD/BRAIN W/O DYE: CPT | Mod: 26

## 2019-01-26 PROCEDURE — 76376 3D RENDER W/INTRP POSTPROCES: CPT | Mod: 26

## 2019-01-26 NOTE — ED PROVIDER NOTE - MUSCULOSKELETAL, MLM
Spine appears normal, range of motion is not limited, no muscle or joint tenderness. 5/5 strength in b/l LEs

## 2019-01-26 NOTE — ED PROVIDER NOTE - EYES, MLM
Clear bilaterally, pupils equal, round and reactive to light.  +periorbital bruising on left eye w/ subconjunctival hemorrhage

## 2019-01-26 NOTE — ED PROVIDER NOTE - ENMT, MLM
Airway patent, Nasal mucosa clear. Mouth with normal mucosa. Throat has no vesicles, no oropharyngeal exudates and uvula is midline.  Head: bruising to mid forehead

## 2019-01-26 NOTE — ED ADULT TRIAGE NOTE - CHIEF COMPLAINT QUOTE
s/p unwitnessed fall.  unknown LOC.  pt has bruising to left eye and forehead.  GCS 14 (baseline per EMS). pt on Eliquis, trauma alert called.

## 2019-01-26 NOTE — ED PROVIDER NOTE - OBJECTIVE STATEMENT
89y/o F w/ PMHx of dementia, A-fib (on Eloquis) w/ chronic pain from spinal stenosis, w/ hx of multiple falls in the past few months BIBA from [facility] assisted [facility] s/p unwitnessed fall.  Unable to obtain a complete history as pt has dementia and is a poor historian.  Per daughter, she has notice pt has had a gradual decrease in mobility and balance. Pt has been given Tramodol for her back pain, daughter feels it has contributed to her imbalance.  Unknown method of ambulation at [facility], pt cannot recall. NKDA

## 2019-01-26 NOTE — ED PROVIDER NOTE - NSFOLLOWUPINSTRUCTIONS_ED_ALL_ED_FT
Follow up with your doctor in 2-3 days.  REturn to the ER for change in mental status, vomiting, weakness or other concerns.

## 2019-01-26 NOTE — ED ADULT NURSE NOTE - NSIMPLEMENTINTERV_GEN_ALL_ED
Implemented All Universal Safety Interventions:  [place] to call system. Call bell, personal items and telephone within reach. Instruct patient to call for assistance. Room bathroom lighting operational. Non-slip footwear when patient is off stretcher. Physically safe environment: no spills, clutter or unnecessary equipment. Stretcher in lowest position, wheels locked, appropriate side rails in place.

## 2019-01-26 NOTE — ED PROVIDER NOTE - MEDICAL DECISION MAKING DETAILS
89y/o F w/ PMHx of dementia, A-fib (on Eloquis) w/ chronic pain from spinal stenosis, w/ hx of multiple falls in the past few months BIBA from [facility] assisted [facility] s/p unwitnessed fall.  Plan: trauma alert, CT head/c-spine/lumbar spine, CT facial, CXR, pelvis XR, cardio work up d/t unwitnessed fall, may be syncope

## 2019-01-27 VITALS
SYSTOLIC BLOOD PRESSURE: 135 MMHG | DIASTOLIC BLOOD PRESSURE: 68 MMHG | TEMPERATURE: 98 F | HEART RATE: 90 BPM | OXYGEN SATURATION: 100 % | RESPIRATION RATE: 18 BRPM

## 2019-01-27 PROBLEM — I48.91 UNSPECIFIED ATRIAL FIBRILLATION: Chronic | Status: ACTIVE | Noted: 2017-12-29

## 2019-01-27 PROBLEM — M48.00 SPINAL STENOSIS, SITE UNSPECIFIED: Chronic | Status: ACTIVE | Noted: 2018-02-02

## 2019-01-27 PROBLEM — F03.90 UNSPECIFIED DEMENTIA WITHOUT BEHAVIORAL DISTURBANCE: Chronic | Status: ACTIVE | Noted: 2018-02-02

## 2019-01-27 PROBLEM — I10 ESSENTIAL (PRIMARY) HYPERTENSION: Chronic | Status: ACTIVE | Noted: 2018-02-02

## 2019-01-27 LAB
ALBUMIN SERPL ELPH-MCNC: 3.5 G/DL — SIGNIFICANT CHANGE UP (ref 3.3–5)
ALP SERPL-CCNC: 79 U/L — SIGNIFICANT CHANGE UP (ref 40–120)
ALT FLD-CCNC: 17 U/L — SIGNIFICANT CHANGE UP (ref 12–78)
ANION GAP SERPL CALC-SCNC: 6 MMOL/L — SIGNIFICANT CHANGE UP (ref 5–17)
APTT BLD: 32.7 SEC — SIGNIFICANT CHANGE UP (ref 27.5–36.3)
AST SERPL-CCNC: 17 U/L — SIGNIFICANT CHANGE UP (ref 15–37)
BILIRUB SERPL-MCNC: 0.3 MG/DL — SIGNIFICANT CHANGE UP (ref 0.2–1.2)
BUN SERPL-MCNC: 27 MG/DL — HIGH (ref 7–23)
CALCIUM SERPL-MCNC: 9.3 MG/DL — SIGNIFICANT CHANGE UP (ref 8.5–10.1)
CHLORIDE SERPL-SCNC: 110 MMOL/L — HIGH (ref 96–108)
CO2 SERPL-SCNC: 30 MMOL/L — SIGNIFICANT CHANGE UP (ref 22–31)
CREAT SERPL-MCNC: 0.76 MG/DL — SIGNIFICANT CHANGE UP (ref 0.5–1.3)
GLUCOSE SERPL-MCNC: 116 MG/DL — HIGH (ref 70–99)
INR BLD: 1.12 RATIO — SIGNIFICANT CHANGE UP (ref 0.88–1.16)
MAGNESIUM SERPL-MCNC: 2.3 MG/DL — SIGNIFICANT CHANGE UP (ref 1.6–2.6)
POTASSIUM SERPL-MCNC: 3.6 MMOL/L — SIGNIFICANT CHANGE UP (ref 3.5–5.3)
POTASSIUM SERPL-SCNC: 3.6 MMOL/L — SIGNIFICANT CHANGE UP (ref 3.5–5.3)
PROT SERPL-MCNC: 6.7 GM/DL — SIGNIFICANT CHANGE UP (ref 6–8.3)
PROTHROM AB SERPL-ACNC: 12.5 SEC — SIGNIFICANT CHANGE UP (ref 10–12.9)
SODIUM SERPL-SCNC: 146 MMOL/L — HIGH (ref 135–145)
TROPONIN I SERPL-MCNC: <0.015 NG/ML — SIGNIFICANT CHANGE UP (ref 0.01–0.04)
TROPONIN I SERPL-MCNC: <0.04 NG/ML — SIGNIFICANT CHANGE UP (ref 0.01–0.04)

## 2019-06-12 ENCOUNTER — EMERGENCY (EMERGENCY)
Facility: HOSPITAL | Age: 84
LOS: 0 days | Discharge: HOME | End: 2019-06-12
Attending: EMERGENCY MEDICINE | Admitting: EMERGENCY MEDICINE
Payer: MEDICARE

## 2019-06-12 VITALS
TEMPERATURE: 98 F | HEART RATE: 90 BPM | SYSTOLIC BLOOD PRESSURE: 150 MMHG | DIASTOLIC BLOOD PRESSURE: 65 MMHG | OXYGEN SATURATION: 99 % | RESPIRATION RATE: 17 BRPM

## 2019-06-12 VITALS
OXYGEN SATURATION: 98 % | RESPIRATION RATE: 18 BRPM | SYSTOLIC BLOOD PRESSURE: 156 MMHG | HEART RATE: 101 BPM | DIASTOLIC BLOOD PRESSURE: 85 MMHG | TEMPERATURE: 99 F

## 2019-06-12 DIAGNOSIS — I10 ESSENTIAL (PRIMARY) HYPERTENSION: ICD-10-CM

## 2019-06-12 DIAGNOSIS — Z79.01 LONG TERM (CURRENT) USE OF ANTICOAGULANTS: ICD-10-CM

## 2019-06-12 DIAGNOSIS — F03.90 UNSPECIFIED DEMENTIA WITHOUT BEHAVIORAL DISTURBANCE: ICD-10-CM

## 2019-06-12 DIAGNOSIS — R94.31 ABNORMAL ELECTROCARDIOGRAM [ECG] [EKG]: ICD-10-CM

## 2019-06-12 DIAGNOSIS — I67.89 OTHER CEREBROVASCULAR DISEASE: ICD-10-CM

## 2019-06-12 DIAGNOSIS — I48.91 UNSPECIFIED ATRIAL FIBRILLATION: ICD-10-CM

## 2019-06-12 DIAGNOSIS — Z04.3 ENCOUNTER FOR EXAMINATION AND OBSERVATION FOLLOWING OTHER ACCIDENT: ICD-10-CM

## 2019-06-12 DIAGNOSIS — M48.00 SPINAL STENOSIS, SITE UNSPECIFIED: ICD-10-CM

## 2019-06-12 LAB
ALBUMIN SERPL ELPH-MCNC: 3.7 G/DL — SIGNIFICANT CHANGE UP (ref 3.3–5)
ALP SERPL-CCNC: 80 U/L — SIGNIFICANT CHANGE UP (ref 40–120)
ALT FLD-CCNC: 24 U/L — SIGNIFICANT CHANGE UP (ref 12–78)
ANION GAP SERPL CALC-SCNC: 10 MMOL/L — SIGNIFICANT CHANGE UP (ref 5–17)
AST SERPL-CCNC: 25 U/L — SIGNIFICANT CHANGE UP (ref 15–37)
BASOPHILS # BLD AUTO: 0.02 K/UL — SIGNIFICANT CHANGE UP (ref 0–0.2)
BASOPHILS NFR BLD AUTO: 0.3 % — SIGNIFICANT CHANGE UP (ref 0–2)
BILIRUB SERPL-MCNC: 0.8 MG/DL — SIGNIFICANT CHANGE UP (ref 0.2–1.2)
BUN SERPL-MCNC: 17 MG/DL — SIGNIFICANT CHANGE UP (ref 7–23)
CALCIUM SERPL-MCNC: 9.3 MG/DL — SIGNIFICANT CHANGE UP (ref 8.5–10.1)
CHLORIDE SERPL-SCNC: 104 MMOL/L — SIGNIFICANT CHANGE UP (ref 96–108)
CO2 SERPL-SCNC: 27 MMOL/L — SIGNIFICANT CHANGE UP (ref 22–31)
CREAT SERPL-MCNC: 0.78 MG/DL — SIGNIFICANT CHANGE UP (ref 0.5–1.3)
EOSINOPHIL # BLD AUTO: 0.02 K/UL — SIGNIFICANT CHANGE UP (ref 0–0.5)
EOSINOPHIL NFR BLD AUTO: 0.3 % — SIGNIFICANT CHANGE UP (ref 0–6)
GLUCOSE SERPL-MCNC: 87 MG/DL — SIGNIFICANT CHANGE UP (ref 70–99)
HCT VFR BLD CALC: 39.7 % — SIGNIFICANT CHANGE UP (ref 34.5–45)
HGB BLD-MCNC: 12.8 G/DL — SIGNIFICANT CHANGE UP (ref 11.5–15.5)
IMM GRANULOCYTES NFR BLD AUTO: 0.3 % — SIGNIFICANT CHANGE UP (ref 0–1.5)
LYMPHOCYTES # BLD AUTO: 1.67 K/UL — SIGNIFICANT CHANGE UP (ref 1–3.3)
LYMPHOCYTES # BLD AUTO: 22.2 % — SIGNIFICANT CHANGE UP (ref 13–44)
MCHC RBC-ENTMCNC: 32.2 GM/DL — SIGNIFICANT CHANGE UP (ref 32–36)
MCHC RBC-ENTMCNC: 32.3 PG — SIGNIFICANT CHANGE UP (ref 27–34)
MCV RBC AUTO: 100.3 FL — HIGH (ref 80–100)
MONOCYTES # BLD AUTO: 0.7 K/UL — SIGNIFICANT CHANGE UP (ref 0–0.9)
MONOCYTES NFR BLD AUTO: 9.3 % — SIGNIFICANT CHANGE UP (ref 2–14)
NEUTROPHILS # BLD AUTO: 5.09 K/UL — SIGNIFICANT CHANGE UP (ref 1.8–7.4)
NEUTROPHILS NFR BLD AUTO: 67.6 % — SIGNIFICANT CHANGE UP (ref 43–77)
PLATELET # BLD AUTO: 138 K/UL — LOW (ref 150–400)
POTASSIUM SERPL-MCNC: 3.7 MMOL/L — SIGNIFICANT CHANGE UP (ref 3.5–5.3)
POTASSIUM SERPL-SCNC: 3.7 MMOL/L — SIGNIFICANT CHANGE UP (ref 3.5–5.3)
PROT SERPL-MCNC: 7.1 GM/DL — SIGNIFICANT CHANGE UP (ref 6–8.3)
RBC # BLD: 3.96 M/UL — SIGNIFICANT CHANGE UP (ref 3.8–5.2)
RBC # FLD: 14.4 % — SIGNIFICANT CHANGE UP (ref 10.3–14.5)
SODIUM SERPL-SCNC: 141 MMOL/L — SIGNIFICANT CHANGE UP (ref 135–145)
TROPONIN I SERPL-MCNC: 0.03 NG/ML — SIGNIFICANT CHANGE UP (ref 0.01–0.04)
WBC # BLD: 7.52 K/UL — SIGNIFICANT CHANGE UP (ref 3.8–10.5)
WBC # FLD AUTO: 7.52 K/UL — SIGNIFICANT CHANGE UP (ref 3.8–10.5)

## 2019-06-12 PROCEDURE — 70450 CT HEAD/BRAIN W/O DYE: CPT | Mod: 26

## 2019-06-12 PROCEDURE — 71045 X-RAY EXAM CHEST 1 VIEW: CPT | Mod: 26

## 2019-06-12 PROCEDURE — 93010 ELECTROCARDIOGRAM REPORT: CPT

## 2019-06-12 PROCEDURE — 73523 X-RAY EXAM HIPS BI 5/> VIEWS: CPT | Mod: 26

## 2019-06-12 PROCEDURE — 99284 EMERGENCY DEPT VISIT MOD MDM: CPT

## 2019-06-12 NOTE — ED PROVIDER NOTE - OBJECTIVE STATEMENT
89 y/o F with PMHx of A-fib, dementia, HTN presenting to the ED BIBEMS from nursing home s/p unwitnessed fall today. Pt states that she thinks she was walking somewhere when fall occurred. In ED, pt has no complaints. Pt ambulates with walker at baseline. Denies head pain, CP, SOB, neck pain, HA, or back pain. Full history unobtainable due to pt's dementia. Pt is anticoagulated on Eliquis.

## 2019-06-12 NOTE — ED PROVIDER NOTE - ENMT, MLM
Airway patent, Nasal mucosa clear. Mouth with normal mucosa. Throat has no vesicles, no oropharyngeal exudates and uvula is midline. No obvious signs of trauma. No stepoff. Airway patent, Nasal mucosa clear. Mouth with normal mucosa. Throat has no vesicles, no oropharyngeal exudates and uvula is midline. No obvious signs of trauma. No stepoff. No deformity.

## 2019-06-12 NOTE — ED PROVIDER NOTE - CONSTITUTIONAL, MLM
normal... Well appearing, well nourished, awake, alert, oriented to person, place, time/situation and in no apparent distress. Well appearing, well nourished, awake.

## 2019-06-12 NOTE — ED ADULT NURSE NOTE - OBJECTIVE STATEMENT
pt presents to ED  via EMS,pt alert and oriented to self, VS afebrile, as per EMS pt had unwitnessed fall, unknown LOC, pt denies pain and denies falling. pt has no signs of injury, neuros intact, pt confused at baseline, safety maintained will continue to monitor

## 2019-06-12 NOTE — ED ADULT TRIAGE NOTE - CHIEF COMPLAINT QUOTE
Patient found on ground, unwitnessed fall. Patient on eliquis. Denies any complaints. Trauma alert called in triage.

## 2019-06-12 NOTE — ED PROVIDER NOTE - CLINICAL SUMMARY MEDICAL DECISION MAKING FREE TEXT BOX
89 F with dementia in ED s/p unwitnessed fall at nursing home today. Pt does not remember details of fall but states she has no complaints or pain. EKG, labs, CXR, b/l hip XR, CT head. Reeval.

## 2019-06-12 NOTE — ED ADULT NURSE NOTE - NSIMPLEMENTINTERV_GEN_ALL_ED
Implemented All Fall Risk Interventions:  [place] to call system. Call bell, personal items and telephone within reach. Instruct patient to call for assistance. Room bathroom lighting operational. Non-slip footwear when patient is off stretcher. Physically safe environment: no spills, clutter or unnecessary equipment. Stretcher in lowest position, wheels locked, appropriate side rails in place. Provide visual cue, wrist band, yellow gown, etc. Monitor gait and stability. Monitor for mental status changes and reorient to person, place, and time. Review medications for side effects contributing to fall risk. Reinforce activity limits and safety measures with patient and family.

## 2019-07-13 ENCOUNTER — EMERGENCY (EMERGENCY)
Facility: HOSPITAL | Age: 84
LOS: 0 days | Discharge: HOME | End: 2019-07-13
Attending: EMERGENCY MEDICINE | Admitting: EMERGENCY MEDICINE
Payer: MEDICARE

## 2019-07-13 VITALS
SYSTOLIC BLOOD PRESSURE: 176 MMHG | HEIGHT: 63 IN | HEART RATE: 75 BPM | DIASTOLIC BLOOD PRESSURE: 76 MMHG | TEMPERATURE: 98 F | RESPIRATION RATE: 18 BRPM | OXYGEN SATURATION: 97 % | WEIGHT: 139.99 LBS

## 2019-07-13 VITALS
TEMPERATURE: 98 F | SYSTOLIC BLOOD PRESSURE: 150 MMHG | DIASTOLIC BLOOD PRESSURE: 73 MMHG | OXYGEN SATURATION: 98 % | RESPIRATION RATE: 16 BRPM | HEART RATE: 91 BPM

## 2019-07-13 DIAGNOSIS — M48.00 SPINAL STENOSIS, SITE UNSPECIFIED: ICD-10-CM

## 2019-07-13 DIAGNOSIS — S00.31XA ABRASION OF NOSE, INITIAL ENCOUNTER: ICD-10-CM

## 2019-07-13 DIAGNOSIS — Z79.01 LONG TERM (CURRENT) USE OF ANTICOAGULANTS: ICD-10-CM

## 2019-07-13 DIAGNOSIS — F03.90 UNSPECIFIED DEMENTIA WITHOUT BEHAVIORAL DISTURBANCE: ICD-10-CM

## 2019-07-13 DIAGNOSIS — I10 ESSENTIAL (PRIMARY) HYPERTENSION: ICD-10-CM

## 2019-07-13 DIAGNOSIS — I48.91 UNSPECIFIED ATRIAL FIBRILLATION: ICD-10-CM
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
## 2019-07-13 DIAGNOSIS — Z79.899 OTHER LONG TERM (CURRENT) DRUG THERAPY: ICD-10-CM

## 2019-07-13 DIAGNOSIS — S09.90XA UNSPECIFIED INJURY OF HEAD, INITIAL ENCOUNTER: ICD-10-CM
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
## 2019-07-13 LAB
ALBUMIN SERPL ELPH-MCNC: 3.4 G/DL — SIGNIFICANT CHANGE UP (ref 3.3–5)
ALP SERPL-CCNC: 77 U/L — SIGNIFICANT CHANGE UP (ref 40–120)
ALT FLD-CCNC: 19 U/L — SIGNIFICANT CHANGE UP (ref 12–78)
ANION GAP SERPL CALC-SCNC: 5 MMOL/L — SIGNIFICANT CHANGE UP (ref 5–17)
APTT BLD: 31 SEC — SIGNIFICANT CHANGE UP (ref 27.5–36.3)
AST SERPL-CCNC: 21 U/L — SIGNIFICANT CHANGE UP (ref 15–37)
BASOPHILS # BLD AUTO: 0.02 K/UL — SIGNIFICANT CHANGE UP (ref 0–0.2)
BASOPHILS NFR BLD AUTO: 0.3 % — SIGNIFICANT CHANGE UP (ref 0–2)
BILIRUB SERPL-MCNC: 0.7 MG/DL — SIGNIFICANT CHANGE UP (ref 0.2–1.2)
BUN SERPL-MCNC: 19 MG/DL — SIGNIFICANT CHANGE UP (ref 7–23)
CALCIUM SERPL-MCNC: 8.8 MG/DL — SIGNIFICANT CHANGE UP (ref 8.5–10.1)
CHLORIDE SERPL-SCNC: 107 MMOL/L — SIGNIFICANT CHANGE UP (ref 96–108)
CK SERPL-CCNC: 38 U/L — SIGNIFICANT CHANGE UP (ref 26–192)
CO2 SERPL-SCNC: 28 MMOL/L — SIGNIFICANT CHANGE UP (ref 22–31)
CREAT SERPL-MCNC: 0.81 MG/DL — SIGNIFICANT CHANGE UP (ref 0.5–1.3)
EOSINOPHIL # BLD AUTO: 0.08 K/UL — SIGNIFICANT CHANGE UP (ref 0–0.5)
EOSINOPHIL NFR BLD AUTO: 1.2 % — SIGNIFICANT CHANGE UP (ref 0–6)
GLUCOSE SERPL-MCNC: 84 MG/DL — SIGNIFICANT CHANGE UP (ref 70–99)
HCT VFR BLD CALC: 39.8 % — SIGNIFICANT CHANGE UP (ref 34.5–45)
HGB BLD-MCNC: 12.9 G/DL — SIGNIFICANT CHANGE UP (ref 11.5–15.5)
IMM GRANULOCYTES NFR BLD AUTO: 1.4 % — SIGNIFICANT CHANGE UP (ref 0–1.5)
INR BLD: 1.04 RATIO — SIGNIFICANT CHANGE UP (ref 0.88–1.16)
LYMPHOCYTES # BLD AUTO: 1.96 K/UL — SIGNIFICANT CHANGE UP (ref 1–3.3)
LYMPHOCYTES # BLD AUTO: 30.2 % — SIGNIFICANT CHANGE UP (ref 13–44)
MCHC RBC-ENTMCNC: 32.1 PG — SIGNIFICANT CHANGE UP (ref 27–34)
MCHC RBC-ENTMCNC: 32.4 GM/DL — SIGNIFICANT CHANGE UP (ref 32–36)
MCV RBC AUTO: 99 FL — SIGNIFICANT CHANGE UP (ref 80–100)
MONOCYTES # BLD AUTO: 0.59 K/UL — SIGNIFICANT CHANGE UP (ref 0–0.9)
MONOCYTES NFR BLD AUTO: 9.1 % — SIGNIFICANT CHANGE UP (ref 2–14)
NEUTROPHILS # BLD AUTO: 3.76 K/UL — SIGNIFICANT CHANGE UP (ref 1.8–7.4)
NEUTROPHILS NFR BLD AUTO: 57.8 % — SIGNIFICANT CHANGE UP (ref 43–77)
PLATELET # BLD AUTO: 143 K/UL — LOW (ref 150–400)
POTASSIUM SERPL-MCNC: 3.8 MMOL/L — SIGNIFICANT CHANGE UP (ref 3.5–5.3)
POTASSIUM SERPL-SCNC: 3.8 MMOL/L — SIGNIFICANT CHANGE UP (ref 3.5–5.3)
PROT SERPL-MCNC: 6.5 GM/DL — SIGNIFICANT CHANGE UP (ref 6–8.3)
PROTHROM AB SERPL-ACNC: 11.6 SEC — SIGNIFICANT CHANGE UP (ref 10–12.9)
RBC # BLD: 4.02 M/UL — SIGNIFICANT CHANGE UP (ref 3.8–5.2)
RBC # FLD: 13.9 % — SIGNIFICANT CHANGE UP (ref 10.3–14.5)
SODIUM SERPL-SCNC: 140 MMOL/L — SIGNIFICANT CHANGE UP (ref 135–145)
TROPONIN I SERPL-MCNC: <0.015 NG/ML — SIGNIFICANT CHANGE UP (ref 0.01–0.04)
TROPONIN I SERPL-MCNC: <0.015 NG/ML — SIGNIFICANT CHANGE UP (ref 0.01–0.04)
WBC # BLD: 6.5 K/UL — SIGNIFICANT CHANGE UP (ref 3.8–10.5)
WBC # FLD AUTO: 6.5 K/UL — SIGNIFICANT CHANGE UP (ref 3.8–10.5)

## 2019-07-13 PROCEDURE — 70450 CT HEAD/BRAIN W/O DYE: CPT | Mod: 26

## 2019-07-13 PROCEDURE — 71045 X-RAY EXAM CHEST 1 VIEW: CPT | Mod: 26

## 2019-07-13 PROCEDURE — 72125 CT NECK SPINE W/O DYE: CPT | Mod: 26

## 2019-07-13 PROCEDURE — 99284 EMERGENCY DEPT VISIT MOD MDM: CPT

## 2019-07-13 PROCEDURE — 72170 X-RAY EXAM OF PELVIS: CPT | Mod: 26

## 2019-07-13 PROCEDURE — 93010 ELECTROCARDIOGRAM REPORT: CPT

## 2019-07-13 RX ORDER — TETANUS TOXOID, REDUCED DIPHTHERIA TOXOID AND ACELLULAR PERTUSSIS VACCINE, ADSORBED 5; 2.5; 8; 8; 2.5 [IU]/.5ML; [IU]/.5ML; UG/.5ML; UG/.5ML; UG/.5ML
0.5 SUSPENSION INTRAMUSCULAR ONCE
Refills: 0 | Status: COMPLETED | OUTPATIENT
Start: 2019-07-13 | End: 2019-07-13

## 2019-07-13 RX ADMIN — TETANUS TOXOID, REDUCED DIPHTHERIA TOXOID AND ACELLULAR PERTUSSIS VACCINE, ADSORBED 0.5 MILLILITER(S): 5; 2.5; 8; 8; 2.5 SUSPENSION INTRAMUSCULAR at 11:02

## 2019-07-13 NOTE — ED PROVIDER NOTE - PHYSICAL EXAMINATION
Constitutional: mild distress AAOx2  Eyes: PERRLA EOMI. No racccon eyes or mata signs  Head: Normocephalic atraumatic  Mouth: MMM  Cardiac: regular rate   Resp: Lungs CTAB  GI: Abd s/nt/nd  Neuro: CN2-12 intact  Skin: +abrasion to nose, no nasal septal hematoma  MSJ: No TTP to midline. No TTP to chest wall. Pelvis is stable. full ROM of all extremities.+ TTP right paraspinal muscle Constitutional: NADs AAOx2  Eyes: PERRLA EOMI. No racccon eyes or mata signs  Head: Normocephalic atraumatic  Mouth: MMM  Cardiac: regular rate   Resp: Lungs CTAB  GI: Abd s/nt/nd  Neuro: CN2-12 intact  Skin: +abrasion to nose, no nasal septal hematoma  MSJ: No TTP to midline. No TTP to chest wall. Pelvis is stable. full ROM of all extremities.+ TTP right paraspinal muscle

## 2019-07-13 NOTE — ED ADULT TRIAGE NOTE - CHIEF COMPLAINT QUOTE
pt presents to ED s/p unwitnessed fall on eliquis. pt was found on floor by staff at approximately 0815 am. abrasion noted to nose. unknown LOC.

## 2019-07-13 NOTE — ED PROVIDER NOTE - PROGRESS NOTE DETAILS
Fawn AS for Dr. ADAN Juarez: Pt ambulatory to bathroom at baseline. trop negative x 2 - granddaughter at bedside states pt is at baseline - will d/c back to facility. Guanakito Juarez M.D., Attending Physician

## 2019-07-13 NOTE — ED PROVIDER NOTE - NS_ ATTENDINGSCRIBEDETAILS _ED_A_ED_FT
I, Guanakito Juarez MD,  performed the initial face to face bedside interview with this patient regarding history of present illness, review of symptoms and relevant past medical, social and family history.  I completed an independent physical examination.  I was the initial provider who evaluated this patient.  The history, relevant review of systems, past medical and surgical history, medical decision making, and physical examination was documented by the scribe in my presence and I attest to the accuracy of the documentation.

## 2019-07-13 NOTE — ED ADULT NURSE NOTE - OBJECTIVE STATEMENT
Pt alert and oriented x2, pt presents s/p fall on eliquis. uknown LOC, pt hit head. see trauma flow sheet for further eval.

## 2019-07-13 NOTE — ED PROVIDER NOTE - NSFOLLOWUPINSTRUCTIONS_ED_ALL_ED_FT
1. return for worsening symptoms or anything concerning to you  2. take all home meds as prescribed  3. follow up with your pmd call to make an appointment    Head Injury    WHAT YOU NEED TO KNOW:    A head injury is most often caused by a blow to the head. This may occur from a fall, bicycle injury, sports injury, being struck in the head, or a motor vehicle accident.     DISCHARGE INSTRUCTIONS:    Call 911 or have someone else call for any of the following:     You cannot be woken.      You have a seizure.      You stop responding to others or you faint.      You have blurry or double vision.      Your speech becomes slurred or confused.      You have arm or leg weakness, loss of feeling, or new problems with coordination.      Your pupils are larger than usual or one pupil is a different size than the other.       You have blood or clear fluid coming out of your ears or nose.    Return to the emergency department if:     You have repeated or forceful vomiting.      You feel confused.      Your headache gets worse or becomes severe.      You or someone caring for you notices that you are harder to wake than usual.    Contact your healthcare provider if:     Your symptoms last longer than 6 weeks after the injury.      You have questions or concerns about your condition or care.    Medicines:     Acetaminophen decreases pain. Acetaminophen is available without a doctor's order. Ask how much to take and how often to take it. Follow directions. Acetaminophen can cause liver damage if not taken correctly.      Take your medicine as directed. Contact your healthcare provider if you think your medicine is not helping or if you have side effects. Tell him or her if you are allergic to any medicine. Keep a list of the medicines, vitamins, and herbs you take. Include the amounts, and when and why you take them. Bring the list or the pill bottles to follow-up visits. Carry your medicine list with you in case of an emergency.    Self-care:     Rest or do quiet activities for 24 to 48 hours. Limit your time watching TV, using the computer, or doing tasks that require a lot of thinking. Slowly return to your normal activities as directed. Do not play sports or do activities that may cause you to get hit in the head. Ask your healthcare provider when you can return to sports.       Apply ice on your head for 15 to 20 minutes every hour or as directed. Use an ice pack, or put crushed ice in a plastic bag. Cover it with a towel before you apply it to your skin. Ice helps prevent tissue damage and decreases swelling and pain.       Have someone stay with you for 24 hours or as directed. This person can monitor you for complications and call 911. When you are awake the person should ask you a few questions to see if you are thinking clearly. An example would be to ask your name or your address.     Prevent another head injury:     Wear a helmet that fits properly. Do this when you play sports, or ride a bike, scooter, or skateboard. Helmets help decrease your risk of a serious head injury. Talk to your healthcare provider about other ways you can protect yourself if you play sports.      Wear your seat belt every time you are in a car. This helps to decrease your risk for a head injury if you are in a car accident.     Follow up with your healthcare provider as directed: Write down your questions so you remember to ask them during your visits.

## 2019-07-13 NOTE — ED PROVIDER NOTE - NS ED ROS FT
Constitutional: No fever or chills  Eyes: No visual changes  HEENT: No throat pain  CV: No chest pain  Resp: No SOB no cough  GI: No abd pain, nausea or vomiting  : No dysuria  MSK: No musculoskeletal pain  Skin: No rash +abrasion to nose  Neuro: No headache

## 2019-07-13 NOTE — ED PROVIDER NOTE - OBJECTIVE STATEMENT
89 y/o female with a PMHx of Afib on Eliquis, HTN, Dementia, Spinal Stenosis presents to the ED s/p fall today, unwitnessed. Per EMS, pt was found at [facility] on the floor next to her bed at around 8:50 am. Pt presents with an abrasion on her nose. Denies chest pain, SOB, dizziness. Staff states that the pt has been acting at baseline s/p fall. Pt is a poor historian due to being demented, all hx taken from EMS at bedside.

## 2019-07-13 NOTE — ED PROVIDER NOTE - CLINICAL SUMMARY MEDICAL DECISION MAKING FREE TEXT BOX
89 y/o female, Afib in Eliquis, dementia presents to the ED s/p fall, pt was found on ground next to bed, unwitnessed fall. Pt does not remember how and why she fell. asymptomatic now. Here exam with abrasion to nose, otherwise nonfocal. Given fall on Eliquis will r/o traumatic injury, assess for signs of ACS, and reassess.

## 2019-07-13 NOTE — ED ADULT NURSE NOTE - NSIMPLEMENTINTERV_GEN_ALL_ED
Implemented All Fall with Harm Risk Interventions:  [place] to call system. Call bell, personal items and telephone within reach. Instruct patient to call for assistance. Room bathroom lighting operational. Non-slip footwear when patient is off stretcher. Physically safe environment: no spills, clutter or unnecessary equipment. Stretcher in lowest position, wheels locked, appropriate side rails in place. Provide visual cue, wrist band, yellow gown, etc. Monitor gait and stability. Monitor for mental status changes and reorient to person, place, and time. Review medications for side effects contributing to fall risk. Reinforce activity limits and safety measures with patient and family. Provide visual clues: red socks.

## 2019-07-14 ENCOUNTER — EMERGENCY (EMERGENCY)
Facility: HOSPITAL | Age: 84
LOS: 0 days | Discharge: HOME | End: 2019-07-14
Attending: STUDENT IN AN ORGANIZED HEALTH CARE EDUCATION/TRAINING PROGRAM | Admitting: STUDENT IN AN ORGANIZED HEALTH CARE EDUCATION/TRAINING PROGRAM
Payer: MEDICARE

## 2019-07-14 VITALS
WEIGHT: 119.93 LBS | OXYGEN SATURATION: 90 % | HEART RATE: 79 BPM | HEIGHT: 62 IN | TEMPERATURE: 98 F | SYSTOLIC BLOOD PRESSURE: 99 MMHG | RESPIRATION RATE: 17 BRPM | DIASTOLIC BLOOD PRESSURE: 45 MMHG

## 2019-07-14 VITALS
SYSTOLIC BLOOD PRESSURE: 122 MMHG | TEMPERATURE: 98 F | OXYGEN SATURATION: 100 % | DIASTOLIC BLOOD PRESSURE: 67 MMHG | RESPIRATION RATE: 18 BRPM | HEART RATE: 81 BPM
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
## 2019-07-14 DIAGNOSIS — R29.6 REPEATED FALLS: ICD-10-CM

## 2019-07-14 DIAGNOSIS — I48.91 UNSPECIFIED ATRIAL FIBRILLATION: ICD-10-CM

## 2019-07-14 DIAGNOSIS — F03.90 UNSPECIFIED DEMENTIA WITHOUT BEHAVIORAL DISTURBANCE: ICD-10-CM

## 2019-07-14 DIAGNOSIS — N39.0 URINARY TRACT INFECTION, SITE NOT SPECIFIED: ICD-10-CM
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
## 2019-07-14 DIAGNOSIS — I10 ESSENTIAL (PRIMARY) HYPERTENSION: ICD-10-CM

## 2019-07-14 DIAGNOSIS — Z79.01 LONG TERM (CURRENT) USE OF ANTICOAGULANTS: ICD-10-CM
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
## 2019-07-14 DIAGNOSIS — Z79.899 OTHER LONG TERM (CURRENT) DRUG THERAPY: ICD-10-CM

## 2019-07-14 DIAGNOSIS — M48.00 SPINAL STENOSIS, SITE UNSPECIFIED: ICD-10-CM

## 2019-07-14 LAB
ALBUMIN SERPL ELPH-MCNC: 3.6 G/DL — SIGNIFICANT CHANGE UP (ref 3.3–5)
ALP SERPL-CCNC: 80 U/L — SIGNIFICANT CHANGE UP (ref 40–120)
ALT FLD-CCNC: 16 U/L — SIGNIFICANT CHANGE UP (ref 12–78)
ANION GAP SERPL CALC-SCNC: 8 MMOL/L — SIGNIFICANT CHANGE UP (ref 5–17)
APPEARANCE UR: ABNORMAL
AST SERPL-CCNC: 15 U/L — SIGNIFICANT CHANGE UP (ref 15–37)
BACTERIA # UR AUTO: ABNORMAL
BASOPHILS # BLD AUTO: 0.01 K/UL — SIGNIFICANT CHANGE UP (ref 0–0.2)
BASOPHILS NFR BLD AUTO: 0.1 % — SIGNIFICANT CHANGE UP (ref 0–2)
BILIRUB SERPL-MCNC: 0.5 MG/DL — SIGNIFICANT CHANGE UP (ref 0.2–1.2)
BILIRUB UR-MCNC: ABNORMAL
BUN SERPL-MCNC: 17 MG/DL — SIGNIFICANT CHANGE UP (ref 7–23)
CALCIUM SERPL-MCNC: 9 MG/DL — SIGNIFICANT CHANGE UP (ref 8.5–10.1)
CHLORIDE SERPL-SCNC: 108 MMOL/L — SIGNIFICANT CHANGE UP (ref 96–108)
CO2 SERPL-SCNC: 28 MMOL/L — SIGNIFICANT CHANGE UP (ref 22–31)
COLOR SPEC: YELLOW — SIGNIFICANT CHANGE UP
CREAT SERPL-MCNC: 0.94 MG/DL — SIGNIFICANT CHANGE UP (ref 0.5–1.3)
DIFF PNL FLD: ABNORMAL
EOSINOPHIL # BLD AUTO: 0.06 K/UL — SIGNIFICANT CHANGE UP (ref 0–0.5)
EOSINOPHIL NFR BLD AUTO: 0.9 % — SIGNIFICANT CHANGE UP (ref 0–6)
EPI CELLS # UR: SIGNIFICANT CHANGE UP
GLUCOSE SERPL-MCNC: 123 MG/DL — HIGH (ref 70–99)
GLUCOSE UR QL: NEGATIVE MG/DL — SIGNIFICANT CHANGE UP
HCT VFR BLD CALC: 40.9 % — SIGNIFICANT CHANGE UP (ref 34.5–45)
HGB BLD-MCNC: 13.5 G/DL — SIGNIFICANT CHANGE UP (ref 11.5–15.5)
IMM GRANULOCYTES NFR BLD AUTO: 0.3 % — SIGNIFICANT CHANGE UP (ref 0–1.5)
KETONES UR-MCNC: ABNORMAL
LEUKOCYTE ESTERASE UR-ACNC: ABNORMAL
LYMPHOCYTES # BLD AUTO: 1.68 K/UL — SIGNIFICANT CHANGE UP (ref 1–3.3)
LYMPHOCYTES # BLD AUTO: 25.2 % — SIGNIFICANT CHANGE UP (ref 13–44)
MCHC RBC-ENTMCNC: 32.1 PG — SIGNIFICANT CHANGE UP (ref 27–34)
MCHC RBC-ENTMCNC: 33 GM/DL — SIGNIFICANT CHANGE UP (ref 32–36)
MCV RBC AUTO: 97.1 FL — SIGNIFICANT CHANGE UP (ref 80–100)
MONOCYTES # BLD AUTO: 0.61 K/UL — SIGNIFICANT CHANGE UP (ref 0–0.9)
MONOCYTES NFR BLD AUTO: 9.1 % — SIGNIFICANT CHANGE UP (ref 2–14)
NEUTROPHILS # BLD AUTO: 4.29 K/UL — SIGNIFICANT CHANGE UP (ref 1.8–7.4)
NEUTROPHILS NFR BLD AUTO: 64.4 % — SIGNIFICANT CHANGE UP (ref 43–77)
NITRITE UR-MCNC: NEGATIVE — SIGNIFICANT CHANGE UP
PH UR: 5 — SIGNIFICANT CHANGE UP (ref 5–8)
PLATELET # BLD AUTO: 165 K/UL — SIGNIFICANT CHANGE UP (ref 150–400)
POTASSIUM SERPL-MCNC: 3.7 MMOL/L — SIGNIFICANT CHANGE UP (ref 3.5–5.3)
POTASSIUM SERPL-SCNC: 3.7 MMOL/L — SIGNIFICANT CHANGE UP (ref 3.5–5.3)
PROT SERPL-MCNC: 6.6 GM/DL — SIGNIFICANT CHANGE UP (ref 6–8.3)
PROT UR-MCNC: 30 MG/DL
RBC # BLD: 4.21 M/UL — SIGNIFICANT CHANGE UP (ref 3.8–5.2)
RBC # FLD: 14 % — SIGNIFICANT CHANGE UP (ref 10.3–14.5)
RBC CASTS # UR COMP ASSIST: SIGNIFICANT CHANGE UP /HPF (ref 0–4)
SODIUM SERPL-SCNC: 144 MMOL/L — SIGNIFICANT CHANGE UP (ref 135–145)
SP GR SPEC: 1.02 — SIGNIFICANT CHANGE UP (ref 1.01–1.02)
UROBILINOGEN FLD QL: NEGATIVE MG/DL — SIGNIFICANT CHANGE UP
WBC # BLD: 6.67 K/UL — SIGNIFICANT CHANGE UP (ref 3.8–10.5)
WBC # FLD AUTO: 6.67 K/UL — SIGNIFICANT CHANGE UP (ref 3.8–10.5)
WBC UR QL: ABNORMAL

## 2019-07-14 PROCEDURE — 72125 CT NECK SPINE W/O DYE: CPT | Mod: 26

## 2019-07-14 PROCEDURE — 71045 X-RAY EXAM CHEST 1 VIEW: CPT | Mod: 26

## 2019-07-14 PROCEDURE — 93010 ELECTROCARDIOGRAM REPORT: CPT

## 2019-07-14 PROCEDURE — 70450 CT HEAD/BRAIN W/O DYE: CPT | Mod: 26

## 2019-07-14 PROCEDURE — 99284 EMERGENCY DEPT VISIT MOD MDM: CPT

## 2019-07-14 PROCEDURE — 72170 X-RAY EXAM OF PELVIS: CPT | Mod: 26

## 2019-07-14 RX ORDER — CEFUROXIME AXETIL 250 MG
1 TABLET ORAL
Qty: 14 | Refills: 0
Start: 2019-07-14 | End: 2019-07-20

## 2019-07-14 RX ORDER — CEFTRIAXONE 500 MG/1
1000 INJECTION, POWDER, FOR SOLUTION INTRAMUSCULAR; INTRAVENOUS ONCE
Refills: 0 | Status: COMPLETED | OUTPATIENT
Start: 2019-07-14 | End: 2019-07-14

## 2019-07-14 RX ADMIN — CEFTRIAXONE 1000 MILLIGRAM(S): 500 INJECTION, POWDER, FOR SOLUTION INTRAMUSCULAR; INTRAVENOUS at 16:54

## 2019-07-14 NOTE — ED PROVIDER NOTE - PROGRESS NOTE DETAILS
I Julieth Clark attest that this documentation has been prepared under the direction and in the presence of Doctor Tomlinson. Davi DO: Found to have uti; antibiotics ordered; EKg unchanged from old; 2 troponins negative; ambulation trial performed in ED; instructed to f/u with pmd in 1-2 days without fail; all discharge instructions given to daughter at bedside.

## 2019-07-14 NOTE — ED ADULT TRIAGE NOTE - CHIEF COMPLAINT QUOTE
pt alert and oriented x1, pt hx of dementia. pt tripped and fell today outside, unknown LOC, or head trauma. Pt on eliquis. Pt was seen in ED yesterday for a fall, patient has abrasion on nose from fall yesterday. Pt was d/c home yesterday. This is patients 3rd fall this month. Pt denies pain at this time.

## 2019-07-14 NOTE — ED PROVIDER NOTE - OBJECTIVE STATEMENT
89 y/o female with a PMHx of Afib on Eliquis, HTN, Dementia, Spinal Stenosis presents to the ED s/p fall today. Per EMS, this is the pt's third fall in the last 24 hours. Pt was found sitting on the ground of [facility]. Pt seen in [facility] yesterday fo a prior fall. Pt denies any pain. Pt is a poor historian due to being demented, hx taken from EMS at bedside. 89 y/o female with a PMHx of Afib on Eliquis, HTN, Dementia, Spinal Stenosis presents to the ED s/p fall today. Per EMS, this is the pt's third fall in the last 24 hours. Pt was found sitting on the ground of [facility]. Pt seen in [facility] yesterday for a prior fall. Pt denies any pain. Pt is a poor historian due to being demented, hx taken from EMS at bedside.

## 2019-07-14 NOTE — ED PROVIDER NOTE - ATTENDING CONTRIBUTION TO CARE
I, Karen Tomlinson DO,  performed the initial face to face bedside interview with this patient regarding history of present illness, review of symptoms and relevant past medical, social and family history.  I completed an independent physical examination.  I was the initial provider who evaluated this patient. I have signed out the follow up of any pending tests (i.e. labs, radiological studies) to the ACP.  I have communicated the patient’s plan of care and disposition with the ACP.  The history, relevant review of systems, past medical and surgical history, medical decision making, and physical examination was documented by the scribe in my presence and I attest to the accuracy of the documentation.

## 2019-07-14 NOTE — ED ADULT NURSE NOTE - NEURO GAIT
Complex Repair And Single Advancement Flap Text: The defect edges were debeveled with a #15 scalpel blade.  The primary defect was closed partially with a complex linear closure.  Given the location of the remaining defect, shape of the defect and the proximity to free margins a single advancement flap was deemed most appropriate for complete closure of the defect.  Using a sterile surgical marker, an appropriate advancement flap was drawn incorporating the defect and placing the expected incisions within the relaxed skin tension lines where possible.    The area thus outlined was incised deep to adipose tissue with a #15 scalpel blade.  The skin margins were undermined to an appropriate distance in all directions utilizing iris scissors. requires assistance

## 2019-07-16 LAB
-  AMIKACIN: SIGNIFICANT CHANGE UP
-  AMPICILLIN/SULBACTAM: SIGNIFICANT CHANGE UP
-  AMPICILLIN: SIGNIFICANT CHANGE UP
-  AZTREONAM: SIGNIFICANT CHANGE UP
-  CEFEPIME: SIGNIFICANT CHANGE UP
-  CEFOXITIN: SIGNIFICANT CHANGE UP
-  CEFTRIAXONE: SIGNIFICANT CHANGE UP
-  CIPROFLOXACIN: SIGNIFICANT CHANGE UP
-  ERTAPENEM: SIGNIFICANT CHANGE UP
-  GENTAMICIN: SIGNIFICANT CHANGE UP
-  IMIPENEM: SIGNIFICANT CHANGE UP
-  LEVOFLOXACIN: SIGNIFICANT CHANGE UP
-  MEROPENEM: SIGNIFICANT CHANGE UP
-  NITROFURANTOIN: SIGNIFICANT CHANGE UP
-  PIPERACILLIN/TAZOBACTAM: SIGNIFICANT CHANGE UP
-  TIGECYCLINE: SIGNIFICANT CHANGE UP
-  TOBRAMYCIN: SIGNIFICANT CHANGE UP
-  TRIMETHOPRIM/SULFAMETHOXAZOLE: SIGNIFICANT CHANGE UP
CULTURE RESULTS: SIGNIFICANT CHANGE UP
METHOD TYPE: SIGNIFICANT CHANGE UP
ORGANISM # SPEC MICROSCOPIC CNT: SIGNIFICANT CHANGE UP
ORGANISM # SPEC MICROSCOPIC CNT: SIGNIFICANT CHANGE UP
SPECIMEN SOURCE: SIGNIFICANT CHANGE UP

## 2019-08-20 NOTE — ED PROVIDER NOTE - CARDIAC, MLM
Per conversation with Dr. Montano and Thea LOVE, patient has been schedule with [facility] in ASC on 8/21/19 at 955a.   Normal rate, regular rhythm.  Heart sounds S1, S2.  No murmurs, rubs or gallops.

## 2019-09-05 NOTE — PATIENT PROFILE ADULT. - CAREGIVER
Number Of Freeze-Thaw Cycles: 1 freeze-thaw cycle Render Note In Bullet Format When Appropriate: No Medical Necessity Clause: This procedure was medically necessary because the lesions that were treated were intensely: Post-Care Instructions: I reviewed with the patient in detail post-care instructions. Patient is to wear sunprotection, and avoid picking at any of the treated lesions. Pt may apply Vaseline to crusted or scabbing areas. Detail Level: Detailed Medical Necessity Information: It is in your best interest to select a reason for this procedure from the list below. All of these items fulfill various CMS LCD requirements except the new and changing color options. Consent: The patient's consent was obtained including but not limited to risks of crusting, scabbing, blistering, scarring, darker or lighter pigmentary change, recurrence, incomplete removal and infection. Duration Of Freeze Thaw-Cycle (Seconds): 15 Detail Level: Simple Declines

## 2019-12-12 ENCOUNTER — EMERGENCY (EMERGENCY)
Facility: HOSPITAL | Age: 84
LOS: 0 days | Discharge: HOME | End: 2019-12-12
Attending: EMERGENCY MEDICINE
Payer: MEDICARE

## 2019-12-12 VITALS
DIASTOLIC BLOOD PRESSURE: 77 MMHG | HEART RATE: 73 BPM | OXYGEN SATURATION: 97 % | RESPIRATION RATE: 17 BRPM | TEMPERATURE: 98 F | SYSTOLIC BLOOD PRESSURE: 156 MMHG

## 2019-12-12 VITALS
WEIGHT: 139.99 LBS | TEMPERATURE: 98 F | OXYGEN SATURATION: 97 % | SYSTOLIC BLOOD PRESSURE: 142 MMHG | HEART RATE: 79 BPM | DIASTOLIC BLOOD PRESSURE: 70 MMHG | HEIGHT: 62 IN | RESPIRATION RATE: 18 BRPM

## 2019-12-12 DIAGNOSIS — Z79.01 LONG TERM (CURRENT) USE OF ANTICOAGULANTS: ICD-10-CM

## 2019-12-12 DIAGNOSIS — F03.90 UNSPECIFIED DEMENTIA WITHOUT BEHAVIORAL DISTURBANCE: ICD-10-CM

## 2019-12-12 DIAGNOSIS — I10 ESSENTIAL (PRIMARY) HYPERTENSION: ICD-10-CM

## 2019-12-12 DIAGNOSIS — N89.8 OTHER SPECIFIED NONINFLAMMATORY DISORDERS OF VAGINA: ICD-10-CM

## 2019-12-12 DIAGNOSIS — M48.00 SPINAL STENOSIS, SITE UNSPECIFIED: ICD-10-CM

## 2019-12-12 DIAGNOSIS — N76.0 ACUTE VAGINITIS: ICD-10-CM

## 2019-12-12 DIAGNOSIS — I48.91 UNSPECIFIED ATRIAL FIBRILLATION: ICD-10-CM

## 2019-12-12 LAB
APPEARANCE UR: ABNORMAL
BILIRUB UR-MCNC: NEGATIVE — SIGNIFICANT CHANGE UP
COLOR SPEC: YELLOW — SIGNIFICANT CHANGE UP
DIFF PNL FLD: ABNORMAL
GLUCOSE UR QL: NEGATIVE MG/DL — SIGNIFICANT CHANGE UP
KETONES UR-MCNC: NEGATIVE — SIGNIFICANT CHANGE UP
LEUKOCYTE ESTERASE UR-ACNC: ABNORMAL
NITRITE UR-MCNC: NEGATIVE — SIGNIFICANT CHANGE UP
PH UR: 8 — SIGNIFICANT CHANGE UP (ref 5–8)
PROT UR-MCNC: 15 MG/DL
SP GR SPEC: 1.01 — SIGNIFICANT CHANGE UP (ref 1.01–1.02)
UROBILINOGEN FLD QL: NEGATIVE MG/DL — SIGNIFICANT CHANGE UP

## 2019-12-12 PROCEDURE — 81001 URINALYSIS AUTO W/SCOPE: CPT

## 2019-12-12 PROCEDURE — 87086 URINE CULTURE/COLONY COUNT: CPT

## 2019-12-12 PROCEDURE — 87186 SC STD MICRODIL/AGAR DIL: CPT

## 2019-12-12 PROCEDURE — 99285 EMERGENCY DEPT VISIT HI MDM: CPT

## 2019-12-12 PROCEDURE — 99284 EMERGENCY DEPT VISIT MOD MDM: CPT

## 2019-12-12 RX ORDER — FLUCONAZOLE 150 MG/1
200 TABLET ORAL ONCE
Refills: 0 | Status: COMPLETED | OUTPATIENT
Start: 2019-12-12 | End: 2019-12-12

## 2019-12-12 RX ADMIN — FLUCONAZOLE 200 MILLIGRAM(S): 150 TABLET ORAL at 15:52

## 2019-12-12 NOTE — ED PROVIDER NOTE - PROGRESS NOTE DETAILS
patient resting in bed, no acute distress. Given single dose fluconazole. Will d/c back to NH w instructions to continue antibx started yesterday, and to f/u w primary medical doctor. Return precautions discussed   Abdulkadir Douglas MD, PGY3 Emergency Medicine

## 2019-12-12 NOTE — ED PROVIDER NOTE - PATIENT PORTAL LINK FT
You can access the FollowMyHealth Patient Portal offered by [facility] by registering at the following website: [email or web address]. By joining [facility]’s FollowMyHealth portal, you will also be able to view your health information using other applications (apps) compatible with our system.

## 2019-12-12 NOTE — ED PROVIDER NOTE - OBJECTIVE STATEMENT
89 y/o F with a PMHx of spinal stenosis, HTN, dementia, Afib presents to the ED [facility] from [facility] for vaginal discharge associated with a foul smelling odor. Pt was prescribed Nitrofurantoin yesterday for a UTI and took 1 dose. NH staff reports that vaginal discharge is worsening despite abx prompting ED visit. Full history unobtainable due to pt's dementia.

## 2019-12-12 NOTE — ED PROVIDER NOTE - NSFOLLOWUPINSTRUCTIONS_ED_ALL_ED_FT
Please follow up with your primary care physician for further care and evaluation.  If testing was performed in the Emergency Department, please bring copies of all test results to your doctor.  Please continue to take all home medications as previously prescribed.    CONTINUE COURSE OF ANTIBIOTICS WHICH WAS BEGUN YESTERDAY     Return to hospital for any new or concerning symptoms, including but not limited to: fevers, chills, nausea, vomiting, headache, dizziness, lightheadedness, chest pain, shortness of breath, difficulty breathing, abdominal pain, weakness, or any other new or concerning symptoms.

## 2019-12-12 NOTE — ED PROVIDER NOTE - CLINICAL SUMMARY MEDICAL DECISION MAKING FREE TEXT BOX
Pt with vulva vaginitis. Will give Fluconazole will check UA, culture, already on Nitrofurantoin. Likely d/c back to NH.

## 2019-12-12 NOTE — ED ADULT NURSE NOTE - OBJECTIVE STATEMENT
Pt. sent in from [facility] for eval of foul smelling vaginal discharge. Pt. has known UTI and on Diflucan, pt. took 1 dose and staff reports that odor is becoming worse. Pt. denies urinary burning/urgency. Pt Pt. sent in from [facility] for eval of foul smelling vaginal discharge. Pt. has known UTI and on Diflucan, pt. took 1 dose and staff reports that odor is becoming worse. Pt. denies urinary burning/urgency. Pt. poor historian and confused at baseline, daughter at bedside.

## 2019-12-12 NOTE — ED ADULT NURSE NOTE - NS ED NOTE ABUSE RESPONSE YN
January 15, 2019      Mariza Dc, FNP-C  [address] [facility]  [facility] [address]           [facility]  [address]  [place] [address]  Phone: [phone]          Patient: Jair Patiño   MR Number: [id number]   YOB: 2002   Date of Visit: 1/8/2019       Dear Mariza Dc:    Thank you for referring Jair Patiño to me for evaluation. Attached you will find relevant portions of my assessment and plan of care.    If you have questions, please do not hesitate to call me. I look forward to following Jair Patiño along with you.    Sincerely,    Rey Shah MD    Enclosure  CC:  No Recipients    If you would like to receive this communication electronically, please contact [email or web address] or [phone] to request more information on [employer] Link access.    For providers and/or their staff who would like to refer a patient to [facility], please contact us through our one-stop-shop provider referral line, [facility], at [phone].    If you feel you have received this communication in error or would no longer like to receive these types of communications, please e-mail [email or web address]         
Yes

## 2019-12-12 NOTE — ED ADULT TRIAGE NOTE - CHIEF COMPLAINT QUOTE
Patient comes to ED for vaginal foul smelling odor and Discharge. Patient was prescribed abx yesterday, took 1 dose. they reported worsening vagnial Discharge

## 2019-12-15 NOTE — ED POST DISCHARGE NOTE - RESULT SUMMARY
Urine culture + for E. coli 50-99K CFU. Sensitive to macrobid, which patient was stared on prior to ED arrival on 12/11/19. - Cabrera Parr PA-C

## 2020-01-03 ENCOUNTER — APPOINTMENT (OUTPATIENT)
Dept: OBGYN | Facility: CLINIC | Age: 85
End: 2020-01-03
Payer: MEDICARE

## 2020-01-03 VITALS
DIASTOLIC BLOOD PRESSURE: 80 MMHG | WEIGHT: 144 LBS | HEART RATE: 77 BPM | HEIGHT: 60 IN | OXYGEN SATURATION: 98 % | SYSTOLIC BLOOD PRESSURE: 108 MMHG | BODY MASS INDEX: 28.27 KG/M2 | RESPIRATION RATE: 18 BRPM

## 2020-01-03 VITALS — DIASTOLIC BLOOD PRESSURE: 72 MMHG | SYSTOLIC BLOOD PRESSURE: 122 MMHG

## 2020-01-03 DIAGNOSIS — N95.2 POSTMENOPAUSAL ATROPHIC VAGINITIS: ICD-10-CM

## 2020-01-03 PROCEDURE — 99203 OFFICE O/P NEW LOW 30 MIN: CPT

## 2020-01-03 RX ORDER — DOCUSATE SODIUM 100 MG/1
100 CAPSULE ORAL
Refills: 0 | Status: ACTIVE | COMMUNITY

## 2020-01-16 NOTE — PATIENT PROFILE ADULT. - ANESTHESIA, PREVIOUS REACTION, PROFILE
He is requesting Synthroid 50 mcg once a day.  In October, I changed it does to this in asked him to repeat a TSH in 2 months which has not occurred yet.  Please check a TSH on him now  I have signed for the following orders AND/OR meds.  Please call the patient and ask the patient to schedule the testing AND/OR inform about any medications that were sent.      Orders Placed This Encounter   Procedures    TSH     Standing Status:   Future     Standing Expiration Date:   1/15/2021       Medications Ordered This Encounter   Medications    levothyroxine (SYNTHROID) 50 MCG tablet     Sig: TAKE 1 TABLET(50 MCG) BY MOUTH EVERY DAY     Dispense:  30 tablet     Refill:  0      none

## 2020-01-22 NOTE — ED ADULT NURSE REASSESSMENT NOTE - NS ED NURSE REASSESS COMMENT FT1
pt ambulating to the bathroom with walker and assistance. Will CTM Mid-Level Procedure Text (D): After obtaining clear surgical margins the patient was sent to a mid-level provider for surgical repair.  The patient understands they will receive post-surgical care and follow-up from the mid-level provider.

## 2020-08-09 ENCOUNTER — EMERGENCY (EMERGENCY)
Facility: HOSPITAL | Age: 85
LOS: 0 days | Discharge: HOME | End: 2020-08-09
Attending: EMERGENCY MEDICINE
Payer: MEDICARE

## 2020-08-09 VITALS
SYSTOLIC BLOOD PRESSURE: 150 MMHG | DIASTOLIC BLOOD PRESSURE: 82 MMHG | RESPIRATION RATE: 19 BRPM | HEART RATE: 79 BPM | OXYGEN SATURATION: 99 % | TEMPERATURE: 98 F

## 2020-08-09 VITALS
HEART RATE: 84 BPM | RESPIRATION RATE: 18 BRPM | DIASTOLIC BLOOD PRESSURE: 87 MMHG | SYSTOLIC BLOOD PRESSURE: 132 MMHG | OXYGEN SATURATION: 100 % | TEMPERATURE: 99 F

## 2020-08-09 DIAGNOSIS — M48.00 SPINAL STENOSIS, SITE UNSPECIFIED: ICD-10-CM
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
## 2020-08-09 DIAGNOSIS — I10 ESSENTIAL (PRIMARY) HYPERTENSION: ICD-10-CM

## 2020-08-09 DIAGNOSIS — F03.90 UNSPECIFIED DEMENTIA WITHOUT BEHAVIORAL DISTURBANCE: ICD-10-CM
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
## 2020-08-09 DIAGNOSIS — I48.91 UNSPECIFIED ATRIAL FIBRILLATION: ICD-10-CM

## 2020-08-09 DIAGNOSIS — S09.90XA UNSPECIFIED INJURY OF HEAD, INITIAL ENCOUNTER: ICD-10-CM

## 2020-08-09 DIAGNOSIS — Z79.01 LONG TERM (CURRENT) USE OF ANTICOAGULANTS: ICD-10-CM

## 2020-08-09 DIAGNOSIS — S00.81XA ABRASION OF OTHER PART OF HEAD, INITIAL ENCOUNTER: ICD-10-CM

## 2020-08-09 PROCEDURE — 70450 CT HEAD/BRAIN W/O DYE: CPT

## 2020-08-09 PROCEDURE — 70450 CT HEAD/BRAIN W/O DYE: CPT | Mod: 26

## 2020-08-09 PROCEDURE — 99284 EMERGENCY DEPT VISIT MOD MDM: CPT

## 2020-08-09 PROCEDURE — 99285 EMERGENCY DEPT VISIT HI MDM: CPT | Mod: 25

## 2020-08-09 PROCEDURE — 72125 CT NECK SPINE W/O DYE: CPT

## 2020-08-09 PROCEDURE — 72125 CT NECK SPINE W/O DYE: CPT | Mod: 26

## 2020-08-09 NOTE — ED PROVIDER NOTE - OBJECTIVE STATEMENT
89 y/o female with PMHx of Spinal stenosis, HTN, Dementia, Afib presents to the ED c/o suspected unwitnessed fall last night. Pt is on Xeralto. Pt denies pain. Pt lives in a facility. Pt is a poor historian 2/2 dementia.

## 2020-08-09 NOTE — ED ADULT NURSE REASSESSMENT NOTE - NS ED NURSE REASSESS COMMENT FT1
pt dc to assisted living facility. EMS given dc instructions, facesheet and nonemergent transfer sheet. pt in no acute distress at time of dc. mentating at baseline.

## 2020-08-09 NOTE — ED PROVIDER NOTE - CLINICAL SUMMARY MEDICAL DECISION MAKING FREE TEXT BOX
89 y/o female on Xeralto, had day pass yesterday, and facility noticed abrasion on forehead and sent pt to ED for further evaluation. Pt has no complaints. Will obtain ct neck, if negative return to facility.

## 2020-08-09 NOTE — ED ADULT NURSE REASSESSMENT NOTE - NS ED NURSE REASSESS COMMENT FT1
As per [place] [facility] facility, pt does not need covid swab to return. report received from Eliza contreras. pt in no acute distress at this time. pending transfer to facility. transport is in.

## 2020-08-09 NOTE — ED PROVIDER NOTE - NSFOLLOWUPCLINICS_GEN_ALL_ED_FT
[facility]  Family Medicine  [address] [place] [address]  Phone: [phone]  Fax:   Follow Up Time:

## 2020-08-09 NOTE — ED ADULT TRIAGE NOTE - CHIEF COMPLAINT QUOTE
pt confused, baseline demented, from assisted living, suspected fall. pt has abrasion to forehead. pt on eliquis. as per EMS pt does not recall falling. faculty at assisted living reports pt "had a day pass yesterday, unknown falls". neuro alert initiated.

## 2020-08-09 NOTE — ED PROVIDER NOTE - PATIENT PORTAL LINK FT
You can access the FollowMyHealth Patient Portal offered by [facility] by registering at the following website: [email or web address]. By joining [employer]’s FollowMyHealth portal, you will also be able to view your health information using other applications (apps) compatible with our system.

## 2021-01-01 NOTE — H&P ADULT - NSHPPOASURGSITEINCISION_GEN_ALL_CORE
no

## 2021-02-05 ENCOUNTER — EMERGENCY (EMERGENCY)
Facility: HOSPITAL | Age: 86
LOS: 0 days | Discharge: HOME | End: 2021-02-05
Attending: STUDENT IN AN ORGANIZED HEALTH CARE EDUCATION/TRAINING PROGRAM
Payer: MEDICARE

## 2021-02-05 VITALS
TEMPERATURE: 98 F | SYSTOLIC BLOOD PRESSURE: 146 MMHG | OXYGEN SATURATION: 97 % | RESPIRATION RATE: 16 BRPM | HEART RATE: 83 BPM | DIASTOLIC BLOOD PRESSURE: 75 MMHG

## 2021-02-05 VITALS
WEIGHT: 139.99 LBS | RESPIRATION RATE: 16 BRPM | HEIGHT: 62 IN | HEART RATE: 92 BPM | DIASTOLIC BLOOD PRESSURE: 62 MMHG | SYSTOLIC BLOOD PRESSURE: 135 MMHG

## 2021-02-05 DIAGNOSIS — S09.90XA UNSPECIFIED INJURY OF HEAD, INITIAL ENCOUNTER: ICD-10-CM

## 2021-02-05 DIAGNOSIS — F02.80 DEMENTIA IN OTHER DISEASES CLASSIFIED ELSEWHERE, UNSPECIFIED SEVERITY, WITHOUT BEHAVIORAL DISTURBANCE, PSYCHOTIC DISTURBANCE, MOOD DISTURBANCE, AND ANXIETY: ICD-10-CM

## 2021-02-05 DIAGNOSIS — I10 ESSENTIAL (PRIMARY) HYPERTENSION: ICD-10-CM
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
## 2021-02-05 DIAGNOSIS — M48.00 SPINAL STENOSIS, SITE UNSPECIFIED: ICD-10-CM

## 2021-02-05 DIAGNOSIS — G30.9 ALZHEIMER'S DISEASE, UNSPECIFIED: ICD-10-CM
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
## 2021-02-05 DIAGNOSIS — Z79.01 LONG TERM (CURRENT) USE OF ANTICOAGULANTS: ICD-10-CM

## 2021-02-05 DIAGNOSIS — Z79.891 LONG TERM (CURRENT) USE OF OPIATE ANALGESIC: ICD-10-CM

## 2021-02-05 DIAGNOSIS — I48.91 UNSPECIFIED ATRIAL FIBRILLATION: ICD-10-CM

## 2021-02-05 PROCEDURE — 70450 CT HEAD/BRAIN W/O DYE: CPT | Mod: 26

## 2021-02-05 PROCEDURE — 99283 EMERGENCY DEPT VISIT LOW MDM: CPT

## 2021-02-05 PROCEDURE — 99284 EMERGENCY DEPT VISIT MOD MDM: CPT | Mod: 25

## 2021-02-05 PROCEDURE — 70450 CT HEAD/BRAIN W/O DYE: CPT

## 2021-02-05 NOTE — ED PROVIDER NOTE - CONSTITUTIONAL, MLM
normal... Awake, alert, oriented to person, not to place, time/situation and in no apparent distress.

## 2021-02-05 NOTE — ED PROVIDER NOTE - OBJECTIVE STATEMENT
89 F with PMHx of spinal stenosis, HTN, Alzheimer's disease, Afib on Eliquis presenting to the ED from [facility] s/p unwitnessed fall today. Per Nursing aide, states that she went to grab something in the other room, came back a few minutes later and found patient on the floor. Aide was able to get patient up, was not on the floor for prolonged period of time. Pt ambulates with assistance at baseline, but was placed in wheelchair after fall. Pt brought to the ED for further evaluation. Pt without any complaints at this time. Unable to obtain full HPI secondary to patients dementia.

## 2021-02-05 NOTE — ED ADULT NURSE NOTE - NSIMPLEMENTINTERV_GEN_ALL_ED
Implemented All Fall with Harm Risk Interventions:  [place] to call system. Call bell, personal items and telephone within reach. Instruct patient to call for assistance. Room bathroom lighting operational. Non-slip footwear when patient is off stretcher. Physically safe environment: no spills, clutter or unnecessary equipment. Stretcher in lowest position, wheels locked, appropriate side rails in place. Provide visual cue, wrist band, yellow gown, etc. Monitor gait and stability. Monitor for mental status changes and reorient to person, place, and time. Review medications for side effects contributing to fall risk. Reinforce activity limits and safety measures with patient and family. Provide visual clues: red socks.

## 2021-02-05 NOTE — ED ADULT TRIAGE NOTE - CHIEF COMPLAINT QUOTE
Pt from [place], unwitness fall on Eliquis. Pt was found on floor alert, hx of Alzheimer's, HHA approved to be at bedside.

## 2021-02-05 NOTE — ED ADULT NURSE NOTE - OBJECTIVE STATEMENT
pt suspected fall at nursing home. not witnessed. pt found on floor, alert and at mental baseline. pt confused; HHA at nursing home confirms pt is at baseline. no visible injuries noted.

## 2021-02-05 NOTE — ED ADULT TRIAGE NOTE - MODE OF ARRIVAL
After Visit Summary   7/31/2018    Toña Ruggiero    MRN: [id number]           Patient Information     Date Of Birth          1998        Visit Information        Provider Department      7/31/2018 8:30 AM AN ANCILLARY [facility]        Today's Diagnoses     Screening examination for pulmonary tuberculosis    -  1       Follow-ups after your visit        Your next 10 appointments already scheduled     Aug 03, 2018  9:30 AM CDT   Nurse Only with An Rn   [facility] ([facility]    [address] [facility] [address] [phone]              Who to contact     If you have questions or need follow up information about today's clinic visit or your schedule please contact [facility] directly at [phone].  Normal or non-critical lab and imaging results will be communicated to you by MyChart, letter or phone within 4 business days after the clinic has received the results. If you do not hear from us within 7 days, please contact the clinic through MyChart or phone. If you have a critical or abnormal lab result, we will notify you by phone as soon as possible.  Submit refill requests through [employer] or call your pharmacy and they will forward the refill request to us. Please allow 3 business days for your refill to be completed.          Additional Information About Your Visit        Care EveryWhere ID     This is your Care EveryWhere ID. This could be used by other organizations to access your [facility] medical records  [id number]         Blood Pressure from Last 3 Encounters:   07/18/18 100/55   12/18/17 113/67   06/14/16 91/57    Weight from Last 3 Encounters:   07/18/18 118 lb (53.5 kg)   12/18/17 115 lb (52.2 kg) (24 %)*   06/14/16 110 lb (49.9 kg) (20 %)*     * Growth percentiles are based on CDC 2-20 Years data.              We Performed the Following     TB INTRADERMAL TEST        Primary Care Provider Office Phone # Fax #     [facility] [phone]       [address] [address] [facility] [address]        Equal Access to Services     LINDSEY NEWELL : Hadii aad ku hadvi Sojose, watoda luqservando, janesta katheada hosea, arya bolaños laDallasrobb guardado. So [facility] [phone].    ATENCIÓN: Si habla español, tiene a joseph disposición servicios gratuitos de asistencia lingüística. Llame al [phone].    We comply with applicable federal civil rights laws and [place] laws. We do not discriminate on the basis of race, color, national origin, age, disability, sex, sexual orientation, or gender identity.            Thank you!     Thank you for choosing [facility]  for your care. Our goal is always to provide you with excellent care. Hearing back from our patients is one way we can continue to improve our services. Please take a few minutes to complete the written survey that you may receive in the mail after your visit with us. Thank you!             Your Updated Medication List - Protect others around you: Learn how to safely use, store and throw away your medicines at www.disposemymeds.org.          This list is accurate as of 7/31/18  8:49 AM.  Always use your most recent med list.                   Brand Name Dispense Instructions for use Diagnosis    MULTIPLE VITAMINS/WOMENS PO           norgestim-eth estrad triphasic 0.18/0.215/0.25 MG-35 MCG per tablet    ORTHO TRI-CYCLEN, TRI-SPRINTEC    84 tablet    Take 1 tablet by mouth daily    Encounter for surveillance of contraceptive pills          Ambulance EMS

## 2021-02-05 NOTE — ED PROVIDER NOTE - PROGRESS NOTE DETAILS
Progress Note - Behavioral Health   Zunilda Hernandez 47 y o  male MRN: [id number]  Unit/Bed#Arsenio Boucher 490-26 Encounter: [id number]    The patient was seen for continuing care and reviewed with treatment team   Staff reports that the patient was calmer yesterday and seclusive in the evening  He told his nurse last night that his depression and anxiety were 50/50  He has been cooperative and medication compliant  Quiet and seclusive to self  This morning the patient is lying in his bed with his blanket over his head  He does take it down to answer questions but remains very scant in speech  Somewhat superficial and dismissive  He says this morning he is not anxious and he is a little bit depressed  He says I just want to rest   He does not want to attend groups today because he feels better when he is alone  He said he has no complaints and is not having any side effects to the medication  He said his appetite is decreased but he is sleeping  He said he is not having any suicidal thoughts today  Mental Status Evaluation:  Appearance:  Marginal/poor hygiene, Good eye contact and disheveled   Behavior:  calm, cooperative and Superficial, somewhat dismissive   Mood:  dysphoric   Affect: blunted   Speech: Sparse   Thought Process:  Poverty of thoughts   Thought Content:  Does not verbalize delusional material   Perceptual Disturbances: Denies hallucinations and does not appear to be responding to internal stimuli   Risk Potential: No suicidal or homicidal ideation   Attention/Concentration [place] than expected   Orientation:   Oriented x3   Gait/Station: Not observed   Motor Activity: No abnormal movement noted     Progress Toward Goals:  No change    Assessment/Plan    Principal Problem:    Bipolar 1 disorder, depressed, severe ([facility] )      Recommended Treatment:      Continue lithium 450 mg q 12 hours  We will check a level on Thursday        Continue with pharmacotherapy, group therapy, milieu therapy and occupational therapy  The patient will be maintained on the following medications:    Current Facility-Administered Medications:  acetaminophen 650 mg Oral Q6H PRN Gevena Rubinstein, MD   acetaminophen 650 mg Oral Q4H PRN Nicole Sites, CRNP   acetaminophen 975 mg Oral Q6H PRN Nicole Sites, CRNP   aluminum-magnesium hydroxide-simethicone 30 mL Oral Q4H PRN Gevena Rubinstein, MD   aspirin 81 mg Oral Daily Dasha Miranda MD   atorvastatin 20 mg Oral Daily With Dinner Dasha Plunkett MD   benztropine 1 mg Intramuscular Q6H PRN Gevena Rubinstein, MD   benztropine 1 mg Oral Q6H PRN Gevena Rubinstein, MD   lithium carbonate 450 mg Oral Q12H [facility] Saadia Butterfield MD   LORazepam 2 mg Intramuscular Q4H PRN Gevena Rubinstein, MD   LORazepam 1 mg Oral Q4H PRN Gevena Rubinstein, MD   magnesium hydroxide 30 mL Oral Daily PRN Gevena Rubinstein, MD   OLANZapine 5 mg Intramuscular Q6H PRN Gevena Rubinstein, MD   OLANZapine 5 mg Oral Q6H PRN Gevena Rubinstein, MD   traZODone 50 mg Oral HS PRN Gevena Rubinstein, MD       Risks, benefits and possible side effects of Medications:   Patient does not verbalize understanding at this time and will require further explanation  Davi DO: CT neg for acute process; ambulation trial prior to d/c home; strict return precautions given to patient's aide at bedside.

## 2021-02-05 NOTE — ED ADULT NURSE NOTE - CHIEF COMPLAINT QUOTE
Pt from [place], unwitness fall on Eliquis. Pt was found on floor alert, hx of Alzheimer's, HHA approved to be at bedside.

## 2021-02-05 NOTE — ED PROVIDER NOTE - CLINICAL SUMMARY MEDICAL DECISION MAKING FREE TEXT BOX
89 F presents to the ED s/p fall with dementia. Neuro Alert activated: Plan: CT head, ambulation trial and d/c back to facility.
No

## 2021-02-05 NOTE — ED PROVIDER NOTE - PATIENT PORTAL LINK FT
You can access the FollowMyHealth Patient Portal offered by [facility] by registering at the following website: [email or web address]. By joining [employer]’s FollowMyHealth portal, you will also be able to view your health information using other applications (apps) compatible with our system.

## 2021-02-19 NOTE — DISCHARGE NOTE ADULT - CLICK TO LAUNCH ORM
Pharmacy Vancomycin Consult     Vancomycin Day: 8  Current Dosin mg IV q 24 hours    Temp max:  98.3    Recent Labs     21  0444 21  0134   BUN 34* 32*       Recent Labs     21  0444 21  0134   CREATININE 1.3* 1.3*       Recent Labs     21  0444 21  0134   WBC 11.2* 10.9*         Intake/Output Summary (Last 24 hours) at 2021 0957  Last data filed at 2021 6614  Gross per 24 hour   Intake 550 ml   Output 1500 ml   Net -950 ml     Culture Date Source Results   21 Blood x 2 No growth   21 Blood x 2 No growth   21 Blood Staph coagulase -; staph epidermidis          Ht Readings from Last 1 Encounters:   21 6' (1.829 m)        Wt Readings from Last 1 Encounters:   21 232 lb 14.4 oz (105.6 kg)         Body mass index is 31.59 kg/m². Estimated Creatinine Clearance: 58 mL/min (A) (based on SCr of 1.3 mg/dL (H)). Trough: 19.6    Assessment/Plan: Decrease Vancomycin to 1000 mg IV Q 24 hours.     Electronically signed by Christiano Dodson on 2021 at 9:57 AM .

## 2021-03-09 NOTE — ED ADULT NURSE NOTE - NSINTERVENTIONOPT_GEN_ALL_ED
EMERGENCY DEPARTMENT HISTORY AND PHYSICAL EXAM      Date: 3/9/2021  Patient Name: Shane Huynh    History of Presenting Illness     Chief Complaint   Patient presents with    Neck Pain    Extremity Weakness       History (Context): Shane Huynh is a 37 y.o. gentleman with a past medical history as noted below, who presents with subacute onset, progressive, severe, multifocal pain in the area of the left shoulder, with radiation down the arm, radiation to the scapula, radiation into the posterior neck and upper chest local to the shoulder. The pain is achy, but intermittently shocklike. This has been ongoing for several months intermittently. The pain is made worse with lying down. Intermittent numbness is associated. No relieving features or other associated symptoms. On review of systems, the patient denies fever, chills, rashes,weakness, persistent tingling, hand clumsiness, dropping things, chest pain. PCP: MAIKEL Fields    Current Outpatient Medications   Medication Sig Dispense Refill    gabapentin (NEURONTIN) 300 mg capsule Take 1 Cap by mouth three (3) times daily. Max Daily Amount: 900 mg. 90 Cap 0    naproxen (NAPROSYN) 500 mg tablet Take 1 Tab by mouth two (2) times daily (with meals). 20 Tab 0    methocarbamol (ROBAXIN) 750 mg tablet   0    cholecalciferol, VITAMIN D3, (VITAMIN D3) 5,000 unit tab tablet Take  by mouth daily. Past History     Past Medical History:  Past Medical History:   Diagnosis Date    Joint pain        Past Surgical History:  Past Surgical History:   Procedure Laterality Date    HX ORTHOPAEDIC      broken neck       Family History:  History reviewed. No pertinent family history.     Social History:  Social History     Tobacco Use    Smoking status: Current Every Day Smoker    Smokeless tobacco: Never Used    Tobacco comment: 1 pack last 1 week   Substance Use Topics    Alcohol use: Yes     Comment: occationaly    Drug use: Never Allergies:  No Known Allergies    PMH, PSH, family history, social history, allergies reviewed with the patient with significant items noted above. Review of Systems   As per HPI, otherwise all systems reviewed and negative. Physical Exam     Vitals:    03/09/21 0218   BP: (!) 122/94   Pulse: 78   Resp: 16   Temp: 98.1 °F (36.7 °C)   SpO2: 100%   Weight: 65.8 kg (145 lb)       Gen: Well-appearing, in no acute distress   HEENT: Normocephalic, sclera anicteric  Cardiovascular: Normal rate, regular rhythm, no murmurs, rubs, gallops. Pulses intact and equal distally (radial pulses). Pulmonary: No respiratory distress. No stridor. Clear lungs. ABD: Soft, nontender, nondistended. Neuro: Left upper extremity: Full strength on wrist extension and wrist flexion, full strength hand supination and pronation, full strength thumb abduction and thumb opposition, normal finger crossing, full strength finger abduction, full sensation over the deltoid, full strength on flexion extension of the elbow, normal  strength. Full sensation throughout the upper extremity. Normal speech. Normal mentation. Psych: Normal thought content and thought processes. : No CVA tenderness  EXT: Moves all extremities well. No cyanosis or clubbing. Skin: Warm and well-perfused. Cervical spine:No bony tenderness        Diagnostic Study Results     Labs -   No results found for this or any previous visit (from the past 12 hour(s)). Radiologic Studies -   No orders to display     CT Results  (Last 48 hours)    None        CXR Results  (Last 48 hours)    None            Medical Decision Making   I am the first provider for this patient. I reviewed the vital signs, available nursing notes, past medical history, past surgical history, family history and social history. Vital Signs-Reviewed the patient's vital signs.      Records Reviewed: Personally, on initial evaluation    MDM:   Patient presents with multifocal pain suggestive of thoracic outlet syndrome. Exam significant for completely normal neurologic exam.   DDX considered: Thoracic outlet syndrome, potentially by a multitude of causes including cervical rib, thoracic mass, brachial plexus injury or inflammation  DDX thought to be less likely but also considered due to high risk condition: Vascular thoracic outlet syndrome, as there is no evidence of venous congestion or arterial issue    Patient condition on initial evaluation: Stable    Plan:   Pain Control  Screening for thoracic mass in cervical rib with chest radiograph    Orders as below:  Orders Placed This Encounter    REFERRAL TO PHYSICAL THERAPY    gabapentin (NEURONTIN) 300 mg capsule        ED Course:        Reviewed old chest radiograph which is negative for cause of thoracic outlet syndrome    Patient condition at time of disposition: Stable  DISCHARGE NOTE:    Care plan outlined and precautions discussed. Results were reviewed with the patient. All medications were reviewed with the patient; will d/c home with gabapentin. The patient will be prescribed an outpatient course of physical therapy, and I have taught the patient exercises to help with thoracic outlet syndrome. All of pt's questions and concerns were addressed. Alarm symptoms and return precautions associated with chief complaint and evaluation were reviewed with the patient in detail. The patient demonstrated adequate understanding. Patient was instructed and agrees to follow up with physical therapy, as well as to return to the ED upon further deterioration. Patient is ready to go home. The patient is happy with this plan    Follow-up Information    None         Current Discharge Medication List      START taking these medications    Details   gabapentin (NEURONTIN) 300 mg capsule Take 1 Cap by mouth three (3) times daily. Max Daily Amount: 900 mg.   Qty: 90 Cap, Refills: 0    Associated Diagnoses: Thoracic outlet syndrome Disposition: Discharge home    Diagnosis     Clinical Impression:   1. Thoracic outlet syndrome        Signed,  Arron Childress MD  Emergency Physician  ALEJANDRO Narvaez    As a voice dictation software was utilized to dictate this note, minor word transpositions can occur. I apologize for confusing wording and typographic errors. Please feel free to contact me for clarification. [employer]

## 2021-06-17 NOTE — ED ADULT TRIAGE NOTE - SOURCE OF INFORMATION
\"I was seeing Jose on the wall.\" Pt reports it is \"June 1921.\" Pt will state she is \"in a hotel,\" but is orientated to place. Pt continues to be very restless. Reports my skin is \"itchy\" and feeling things on her skin. CIWA 19, scheduled librium given.     Pt talking to her niece \"Hafsa is stuck in the chair. Hafsa do you need help getting out of the chair?\" Will notify MD.    Patient

## 2021-09-22 ENCOUNTER — EMERGENCY (EMERGENCY)
Facility: HOSPITAL | Age: 86
LOS: 0 days | Discharge: HOME | End: 2021-09-22
Attending: HOSPITALIST
Payer: MEDICARE

## 2021-09-22 VITALS
SYSTOLIC BLOOD PRESSURE: 152 MMHG | RESPIRATION RATE: 17 BRPM | TEMPERATURE: 98 F | HEART RATE: 81 BPM | DIASTOLIC BLOOD PRESSURE: 80 MMHG | OXYGEN SATURATION: 98 %

## 2021-09-22 VITALS
RESPIRATION RATE: 15 BRPM | WEIGHT: 130.07 LBS | HEIGHT: 62 IN | DIASTOLIC BLOOD PRESSURE: 83 MMHG | SYSTOLIC BLOOD PRESSURE: 155 MMHG | HEART RATE: 80 BPM | OXYGEN SATURATION: 97 %

## 2021-09-22 DIAGNOSIS — F03.90 UNSPECIFIED DEMENTIA WITHOUT BEHAVIORAL DISTURBANCE: ICD-10-CM

## 2021-09-22 DIAGNOSIS — I48.91 UNSPECIFIED ATRIAL FIBRILLATION: ICD-10-CM
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
## 2021-09-22 DIAGNOSIS — Z79.01 LONG TERM (CURRENT) USE OF ANTICOAGULANTS: ICD-10-CM

## 2021-09-22 DIAGNOSIS — S09.90XA UNSPECIFIED INJURY OF HEAD, INITIAL ENCOUNTER: ICD-10-CM

## 2021-09-22 DIAGNOSIS — I10 ESSENTIAL (PRIMARY) HYPERTENSION: ICD-10-CM
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
## 2021-09-22 DIAGNOSIS — M48.00 SPINAL STENOSIS, SITE UNSPECIFIED: ICD-10-CM

## 2021-09-22 DIAGNOSIS — Z79.899 OTHER LONG TERM (CURRENT) DRUG THERAPY: ICD-10-CM

## 2021-09-22 PROCEDURE — 70450 CT HEAD/BRAIN W/O DYE: CPT

## 2021-09-22 PROCEDURE — 72125 CT NECK SPINE W/O DYE: CPT | Mod: 26,MA

## 2021-09-22 PROCEDURE — 70450 CT HEAD/BRAIN W/O DYE: CPT | Mod: 26,MA

## 2021-09-22 PROCEDURE — 99284 EMERGENCY DEPT VISIT MOD MDM: CPT | Mod: 25

## 2021-09-22 PROCEDURE — 99284 EMERGENCY DEPT VISIT MOD MDM: CPT

## 2021-09-22 PROCEDURE — 72125 CT NECK SPINE W/O DYE: CPT

## 2021-09-22 PROCEDURE — 71045 X-RAY EXAM CHEST 1 VIEW: CPT | Mod: 26

## 2021-09-22 PROCEDURE — 72170 X-RAY EXAM OF PELVIS: CPT

## 2021-09-22 PROCEDURE — 71045 X-RAY EXAM CHEST 1 VIEW: CPT

## 2021-09-22 PROCEDURE — 72170 X-RAY EXAM OF PELVIS: CPT | Mod: 26

## 2021-09-22 NOTE — ED PROVIDER NOTE - NS_ ATTENDINGSCRIBEDETAILS _ED_A_ED_FT
Ness Nuno MD: The history, relevant review of systems, past medical and surgical history, medical decision making, and physical examination was documented by the scribe in my presence and I attest to the accuracy of the documentation.

## 2021-09-22 NOTE — ED PROVIDER NOTE - NSICDXPASTMEDICALHX_GEN_ALL_CORE_FT
PAST MEDICAL HISTORY:  Atrial fibrillation     Dementia     HTN (hypertension)     Spinal stenosis

## 2021-09-22 NOTE — ED PROVIDER NOTE - PATIENT PORTAL LINK FT
You can access the FollowMyHealth Patient Portal offered by [facility] by registering at the following website: [email or web address]. By joining [email or web address]’s FollowMyHealth portal, you will also be able to view your health information using other applications (apps) compatible with our system.

## 2021-09-22 NOTE — ED PROVIDER NOTE - OBJECTIVE STATEMENT
89 year old female with PMHx of dementia, Afib on Eliquis, spinal stenosis, HTN, presents to the ED brought in by aid s/p mechanical fall from wheelchair. Aid is acting historian. Aid states that pt has been at her baseline all day, but at one point leaned forward while in her wheelchair and fell forward hitting head on floor. Aid denies LOC or obvious injury. Pt does not recall event. Neuro alert activated.

## 2021-09-22 NOTE — ED PROVIDER NOTE - CLINICAL SUMMARY MEDICAL DECISION MAKING FREE TEXT BOX
89 year old female presents s/p mechanical fall from wheelchair, on Eliquis. Neuro alert activated. Head ct and CSpine. CXR. Pelvis Xray.

## 2022-03-11 ENCOUNTER — EMERGENCY (EMERGENCY)
Facility: HOSPITAL | Age: 87
LOS: 0 days | Discharge: HOME | End: 2022-03-11
Attending: EMERGENCY MEDICINE
Payer: MEDICARE

## 2022-03-11 VITALS
TEMPERATURE: 98 F | SYSTOLIC BLOOD PRESSURE: 164 MMHG | HEART RATE: 70 BPM | WEIGHT: 164.91 LBS | OXYGEN SATURATION: 100 % | RESPIRATION RATE: 17 BRPM | HEIGHT: 62 IN | DIASTOLIC BLOOD PRESSURE: 96 MMHG

## 2022-03-11 VITALS
OXYGEN SATURATION: 100 % | TEMPERATURE: 97 F | SYSTOLIC BLOOD PRESSURE: 127 MMHG | RESPIRATION RATE: 16 BRPM | HEART RATE: 63 BPM | DIASTOLIC BLOOD PRESSURE: 81 MMHG

## 2022-03-11 DIAGNOSIS — S09.8XXA OTHER SPECIFIED INJURIES OF HEAD, INITIAL ENCOUNTER: ICD-10-CM

## 2022-03-11 DIAGNOSIS — F02.80 DEMENTIA IN OTHER DISEASES CLASSIFIED ELSEWHERE, UNSPECIFIED SEVERITY, WITHOUT BEHAVIORAL DISTURBANCE, PSYCHOTIC DISTURBANCE, MOOD DISTURBANCE, AND ANXIETY: ICD-10-CM
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
## 2022-03-11 DIAGNOSIS — Z20.822 CONTACT WITH AND (SUSPECTED) EXPOSURE TO COVID-19: ICD-10-CM

## 2022-03-11 DIAGNOSIS — I48.91 UNSPECIFIED ATRIAL FIBRILLATION: ICD-10-CM

## 2022-03-11 DIAGNOSIS — I10 ESSENTIAL (PRIMARY) HYPERTENSION: ICD-10-CM

## 2022-03-11 DIAGNOSIS — G30.9 ALZHEIMER'S DISEASE, UNSPECIFIED: ICD-10-CM
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
## 2022-03-11 LAB
ALBUMIN SERPL ELPH-MCNC: 3.2 G/DL — LOW (ref 3.3–5)
ALP SERPL-CCNC: 70 U/L — SIGNIFICANT CHANGE UP (ref 40–120)
ALT FLD-CCNC: 19 U/L — SIGNIFICANT CHANGE UP (ref 12–78)
ANION GAP SERPL CALC-SCNC: 5 MMOL/L — SIGNIFICANT CHANGE UP (ref 5–17)
APPEARANCE UR: ABNORMAL
AST SERPL-CCNC: 31 U/L — SIGNIFICANT CHANGE UP (ref 15–37)
BASOPHILS # BLD AUTO: 0.02 K/UL — SIGNIFICANT CHANGE UP (ref 0–0.2)
BASOPHILS NFR BLD AUTO: 0.2 % — SIGNIFICANT CHANGE UP (ref 0–2)
BILIRUB SERPL-MCNC: 0.6 MG/DL — SIGNIFICANT CHANGE UP (ref 0.2–1.2)
BILIRUB UR-MCNC: NEGATIVE — SIGNIFICANT CHANGE UP
BUN SERPL-MCNC: 10 MG/DL — SIGNIFICANT CHANGE UP (ref 7–23)
CALCIUM SERPL-MCNC: 9.6 MG/DL — SIGNIFICANT CHANGE UP (ref 8.5–10.1)
CHLORIDE SERPL-SCNC: 108 MMOL/L — SIGNIFICANT CHANGE UP (ref 96–108)
CO2 SERPL-SCNC: 27 MMOL/L — SIGNIFICANT CHANGE UP (ref 22–31)
COLOR SPEC: YELLOW — SIGNIFICANT CHANGE UP
CREAT SERPL-MCNC: 0.7 MG/DL — SIGNIFICANT CHANGE UP (ref 0.5–1.3)
DIFF PNL FLD: ABNORMAL
EGFR: 79 ML/MIN/1.73M2 — SIGNIFICANT CHANGE UP
EOSINOPHIL # BLD AUTO: 0.04 K/UL — SIGNIFICANT CHANGE UP (ref 0–0.5)
EOSINOPHIL NFR BLD AUTO: 0.3 % — SIGNIFICANT CHANGE UP (ref 0–6)
GLUCOSE SERPL-MCNC: 79 MG/DL — SIGNIFICANT CHANGE UP (ref 70–99)
GLUCOSE UR QL: NEGATIVE — SIGNIFICANT CHANGE UP
HCT VFR BLD CALC: 43 % — SIGNIFICANT CHANGE UP (ref 34.5–45)
HGB BLD-MCNC: 14.1 G/DL — SIGNIFICANT CHANGE UP (ref 11.5–15.5)
IMM GRANULOCYTES NFR BLD AUTO: 0.4 % — SIGNIFICANT CHANGE UP (ref 0–1.5)
KETONES UR-MCNC: NEGATIVE — SIGNIFICANT CHANGE UP
LEUKOCYTE ESTERASE UR-ACNC: ABNORMAL
LYMPHOCYTES # BLD AUTO: 18.4 % — SIGNIFICANT CHANGE UP (ref 13–44)
LYMPHOCYTES # BLD AUTO: 2.11 K/UL — SIGNIFICANT CHANGE UP (ref 1–3.3)
MCHC RBC-ENTMCNC: 31.9 PG — SIGNIFICANT CHANGE UP (ref 27–34)
MCHC RBC-ENTMCNC: 32.8 GM/DL — SIGNIFICANT CHANGE UP (ref 32–36)
MCV RBC AUTO: 97.3 FL — SIGNIFICANT CHANGE UP (ref 80–100)
MONOCYTES # BLD AUTO: 0.85 K/UL — SIGNIFICANT CHANGE UP (ref 0–0.9)
MONOCYTES NFR BLD AUTO: 7.4 % — SIGNIFICANT CHANGE UP (ref 2–14)
NEUTROPHILS # BLD AUTO: 8.39 K/UL — HIGH (ref 1.8–7.4)
NEUTROPHILS NFR BLD AUTO: 73.3 % — SIGNIFICANT CHANGE UP (ref 43–77)
NITRITE UR-MCNC: POSITIVE
PH UR: 8 — SIGNIFICANT CHANGE UP (ref 5–8)
PLATELET # BLD AUTO: 268 K/UL — SIGNIFICANT CHANGE UP (ref 150–400)
POTASSIUM SERPL-MCNC: 5 MMOL/L — SIGNIFICANT CHANGE UP (ref 3.5–5.3)
POTASSIUM SERPL-SCNC: 5 MMOL/L — SIGNIFICANT CHANGE UP (ref 3.5–5.3)
PROT SERPL-MCNC: 6.7 GM/DL — SIGNIFICANT CHANGE UP (ref 6–8.3)
PROT UR-MCNC: NEGATIVE — SIGNIFICANT CHANGE UP
RBC # BLD: 4.42 M/UL — SIGNIFICANT CHANGE UP (ref 3.8–5.2)
RBC # FLD: 13.7 % — SIGNIFICANT CHANGE UP (ref 10.3–14.5)
SARS-COV-2 RNA SPEC QL NAA+PROBE: SIGNIFICANT CHANGE UP
SODIUM SERPL-SCNC: 140 MMOL/L — SIGNIFICANT CHANGE UP (ref 135–145)
SP GR SPEC: 1.01 — SIGNIFICANT CHANGE UP (ref 1.01–1.02)
TROPONIN I, HIGH SENSITIVITY RESULT: 14.98 NG/L — SIGNIFICANT CHANGE UP
UROBILINOGEN FLD QL: NEGATIVE — SIGNIFICANT CHANGE UP
WBC # BLD: 11.46 K/UL — HIGH (ref 3.8–10.5)
WBC # FLD AUTO: 11.46 K/UL — HIGH (ref 3.8–10.5)

## 2022-03-11 PROCEDURE — 70486 CT MAXILLOFACIAL W/O DYE: CPT | Mod: 26,MA

## 2022-03-11 PROCEDURE — 70486 CT MAXILLOFACIAL W/O DYE: CPT | Mod: MA

## 2022-03-11 PROCEDURE — 93005 ELECTROCARDIOGRAM TRACING: CPT

## 2022-03-11 PROCEDURE — 71045 X-RAY EXAM CHEST 1 VIEW: CPT | Mod: 26

## 2022-03-11 PROCEDURE — U0005: CPT

## 2022-03-11 PROCEDURE — 81001 URINALYSIS AUTO W/SCOPE: CPT

## 2022-03-11 PROCEDURE — 99285 EMERGENCY DEPT VISIT HI MDM: CPT

## 2022-03-11 PROCEDURE — 93010 ELECTROCARDIOGRAM REPORT: CPT

## 2022-03-11 PROCEDURE — 70450 CT HEAD/BRAIN W/O DYE: CPT | Mod: 26,MA

## 2022-03-11 PROCEDURE — 87186 SC STD MICRODIL/AGAR DIL: CPT

## 2022-03-11 PROCEDURE — 87086 URINE CULTURE/COLONY COUNT: CPT

## 2022-03-11 PROCEDURE — 72125 CT NECK SPINE W/O DYE: CPT | Mod: 26,MA

## 2022-03-11 PROCEDURE — 70450 CT HEAD/BRAIN W/O DYE: CPT | Mod: MA

## 2022-03-11 PROCEDURE — 80053 COMPREHEN METABOLIC PANEL: CPT

## 2022-03-11 PROCEDURE — 71045 X-RAY EXAM CHEST 1 VIEW: CPT

## 2022-03-11 PROCEDURE — U0003: CPT

## 2022-03-11 PROCEDURE — 76376 3D RENDER W/INTRP POSTPROCES: CPT

## 2022-03-11 PROCEDURE — 36415 COLL VENOUS BLD VENIPUNCTURE: CPT

## 2022-03-11 PROCEDURE — 85025 COMPLETE CBC W/AUTO DIFF WBC: CPT

## 2022-03-11 PROCEDURE — 76376 3D RENDER W/INTRP POSTPROCES: CPT | Mod: 26

## 2022-03-11 PROCEDURE — 72125 CT NECK SPINE W/O DYE: CPT | Mod: MA

## 2022-03-11 PROCEDURE — 84484 ASSAY OF TROPONIN QUANT: CPT

## 2022-03-11 PROCEDURE — 99285 EMERGENCY DEPT VISIT HI MDM: CPT | Mod: 25

## 2022-03-11 NOTE — ED ADULT NURSE NOTE - INTERVENTIONS DEFINITIONS
[place] to call system/Call bell, personal items and telephone within reach/Instruct patient to call for assistance/Room bathroom lighting operational

## 2022-03-11 NOTE — ED ADULT NURSE NOTE - NSFALLRSKASSESSTYPE_ED_ALL_ED
Name: Bertin Gonzalez  : 1987         Chief Complaint:     Chief Complaint   Patient presents with   [facility]       History of Present Illness:      Bertin Gonzalez is a 35 y.o.  male who presents with Wellness Program      Kikichel Oro is here today for a wellness visit. He states he is feeling very well and has no medical issues or concerns at this time. He is taking no medications except for the occasional over-the-counter allergy medication as needed. Patient exercises daily and eats well. Currently has a right meniscal tear and will be undergoing an arthroscopic surgery next week. Labs and preop EKG were completed. Past Medical History:     History reviewed. No pertinent past medical history. Reviewed all health maintenance requirements and ordered appropriate tests  There are no preventive care reminders to display for this patient. Past Surgical History:     History reviewed. No pertinent surgical history. Medications:       Prior to Admission medications    Medication Sig Start Date End Date Taking? Authorizing Provider   Elastic Bandages & Supports (KNEE BRACE/HINGED BARS MEDIUM) MISC 1 each by Does not apply route daily 21  Yes HENRY Gurrola - MADHAVI        Allergies:       Aspirin    Social History:     Tobacco:    reports that he has never smoked. He has quit using smokeless tobacco.  Alcohol:      reports current alcohol use of about 1.0 standard drinks of alcohol per week. Drug Use:  reports no history of drug use. Family History:     Family History   Problem Relation Age of Onset    Heart Disease Maternal Grandfather     Heart Disease Paternal Grandfather        Review of Systems:     Positive and Negative as described in HPI    Review of Systems   Constitutional: Negative for chills, fatigue and fever. HENT: Negative for congestion, rhinorrhea and sore throat. Eyes: Negative for visual disturbance.    Respiratory: Negative for cough, shortness of breath and wheezing. Cardiovascular: Negative for chest pain and palpitations. Gastrointestinal: Negative for abdominal pain, constipation, diarrhea, nausea and vomiting. Genitourinary: Negative for difficulty urinating and dysuria. Musculoskeletal: Positive for arthralgias (right knee). Negative for gait problem, neck pain and neck stiffness. Skin: Negative for rash. Neurological: Negative for dizziness, syncope, light-headedness and headaches. Physical Exam:   Vitals:  /68 (Position: Sitting)   Pulse 78   Temp 97.7 °F (36.5 °C) (Temporal)   Resp 20   Ht 6' (1.829 m)   Wt 196 lb 12.8 oz (89.3 kg)   SpO2 98%   BMI 26.69 kg/m²     Physical Exam  Vitals and nursing note reviewed. Constitutional:       General: He is not in acute distress. Appearance: Normal appearance. He is well-developed. He is not ill-appearing. HENT:      Mouth/Throat:      Mouth: Mucous membranes are moist.   Eyes:      General: No scleral icterus. Conjunctiva/sclera: Conjunctivae normal.   Cardiovascular:      Rate and Rhythm: Normal rate and regular rhythm. Heart sounds: Normal heart sounds. No murmur heard. Pulmonary:      Effort: Pulmonary effort is normal.      Breath sounds: Normal breath sounds. No wheezing. Abdominal:      General: Bowel sounds are normal. There is no distension. Palpations: Abdomen is soft. Tenderness: There is no abdominal tenderness. Musculoskeletal:         General: No tenderness. Cervical back: Normal range of motion and neck supple. Right lower leg: No edema. Left lower leg: No edema. Lymphadenopathy:      Cervical: No cervical adenopathy. Skin:     General: Skin is warm and dry. Findings: No rash. Neurological:      Mental Status: He is alert and oriented to person, place, and time.    Psychiatric:         Mood and Affect: Mood normal.         Behavior: Behavior normal.         Data:     Lab Results   Component Value Date  10/20/2021    K 4.6 10/20/2021     10/20/2021    CO2 27 10/20/2021    BUN 14 10/20/2021    CREATININE 1.02 10/20/2021    GLUCOSE 90 10/20/2021     Lab Results   Component Value Date    WBC 6.9 10/20/2021    RBC 4.92 10/20/2021    HGB 14.8 10/20/2021    HCT 46.3 10/20/2021    MCV 94.1 10/20/2021    MCH 30.1 10/20/2021    MCHC 32.0 10/20/2021    RDW 12.5 10/20/2021     10/20/2021    MPV 9.9 10/20/2021     No results found for: TSH  Lab Results   Component Value Date    CHOL 160 10/17/2019    HDL 54 10/17/2019       Assessment/Plan:      Diagnosis Orders   1. Wellness examination         1. Amaury Chand received counseling on the following healthy behaviors: nutrition, exercise and medication adherence  2. Patient given educational materials - see patient instructions  3. Was a self-tracking handout given in paper form or via [employer]? No  If yes, see orders or list here. 4.  Discussed use, benefit, and side effects of prescribed medications. Barriers to medication compliance addressed. All patient questions answered. Pt voiced understanding. 5.  Reviewed prior labs and health maintenance  6. Continue current medications, diet and exercise. Completed Refills   Requested Prescriptions      No prescriptions requested or ordered in this encounter         Return in about 1 year (around 10/20/2022) for Wellness Visit. Initial (On Arrival)

## 2022-03-11 NOTE — ED ADULT NURSE REASSESSMENT NOTE - NS ED NURSE REASSESS COMMENT FT1
IV placed. Labs drawn as ordered. Patient near nurses station for safety. Patient was taken to CT from Triage. Patient does not appear in any distress. Cardiac Monitoring in place as ordered. Will continue to monitor.

## 2022-03-11 NOTE — ED PROVIDER NOTE - OBJECTIVE STATEMENT
77 y/o female with PMHx of dementia, Afib, HTN presents to the ED BIBA for [place] s/p unwitnessed fall. +Head injury. On arrival Neuro alert was called. 89 y/o female with PMHx of dementia, Afib, HTN presents to the ED BIBA for [place] s/p unwitnessed fall. +Head injury. On arrival Neuro alert was called.

## 2022-03-11 NOTE — ED ADULT NURSE REASSESSMENT NOTE - NS ED NURSE REASSESS COMMENT FT1
Ambulation trial completed. Patient daughter requesting to take patient back to facility, delay in transport. Facility staff aware. D/C instructions and Lab Results given to daughter.

## 2022-03-11 NOTE — ED PROVIDER NOTE - NSFOLLOWUPINSTRUCTIONS_ED_ALL_ED_FT
1. return for worsening symptoms or anything concerning to you  2. take all home meds as prescribed  3. follow up with your pmd call to make an appointment  4. your urine was positive for UTI but you have chronic UTI and are on macrobid. we will not change antibiotics until the urine culture returns    Head Injury    WHAT YOU NEED TO KNOW:    A head injury is most often caused by a blow to the head. This may occur from a fall, bicycle injury, sports injury, being struck in the head, or a motor vehicle accident.     DISCHARGE INSTRUCTIONS:    Call 911 or have someone else call for any of the following:     You cannot be woken.      You have a seizure.      You stop responding to others or you faint.      You have blurry or double vision.      Your speech becomes slurred or confused.      You have arm or leg weakness, loss of feeling, or new problems with coordination.      Your pupils are larger than usual or one pupil is a different size than the other.       You have blood or clear fluid coming out of your ears or nose.    Return to the emergency department if:     You have repeated or forceful vomiting.      You feel confused.      Your headache gets worse or becomes severe.      You or someone caring for you notices that you are harder to wake than usual.    Contact your healthcare provider if:     Your symptoms last longer than 6 weeks after the injury.      You have questions or concerns about your condition or care.    Medicines:     Acetaminophen decreases pain. Acetaminophen is available without a doctor's order. Ask how much to take and how often to take it. Follow directions. Acetaminophen can cause liver damage if not taken correctly.      Take your medicine as directed. Contact your healthcare provider if you think your medicine is not helping or if you have side effects. Tell him or her if you are allergic to any medicine. Keep a list of the medicines, vitamins, and herbs you take. Include the amounts, and when and why you take them. Bring the list or the pill bottles to follow-up visits. Carry your medicine list with you in case of an emergency.    Self-care:     Rest or do quiet activities for 24 to 48 hours. Limit your time watching TV, using the computer, or doing tasks that require a lot of thinking. Slowly return to your normal activities as directed. Do not play sports or do activities that may cause you to get hit in the head. Ask your healthcare provider when you can return to sports.       Apply ice on your head for 15 to 20 minutes every hour or as directed. Use an ice pack, or put crushed ice in a plastic bag. Cover it with a towel before you apply it to your skin. Ice helps prevent tissue damage and decreases swelling and pain.       Have someone stay with you for 24 hours or as directed. This person can monitor you for complications and call 911. When you are awake the person should ask you a few questions to see if you are thinking clearly. An example would be to ask your name or your address.     Prevent another head injury:     Wear a helmet that fits properly. Do this when you play sports, or ride a bike, scooter, or skateboard. Helmets help decrease your risk of a serious head injury. Talk to your healthcare provider about other ways you can protect yourself if you play sports.      Wear your seat belt every time you are in a car. This helps to decrease your risk for a head injury if you are in a car accident.     Follow up with your healthcare provider as directed: Write down your questions so you remember to ask them during your visits.

## 2022-03-11 NOTE — ED ADULT TRIAGE NOTE - CHIEF COMPLAINT QUOTE
pt. c/o unwitnessed fall from standing height from nursing home, +facial trauma, abrasions and lacerations noted to nose, forehead. Neuro alert called by Dr. Juarez. pt. sent to CT. pt. at baseline mental status.

## 2022-03-11 NOTE — ED PROVIDER NOTE - PATIENT PORTAL LINK FT
You can access the FollowMyHealth Patient Portal offered by [facility] by registering at the following website: [email or web address]. By joining [facility]’s FollowMyHealth portal, you will also be able to view your health information using other applications (apps) compatible with our system.

## 2022-03-11 NOTE — ED PROVIDER NOTE - NSCAREINITIATED _GEN_ER
Diagnoses of diabetes type 2 and hyperlipidemia run the original documentation because they may contribute to neuropathy. However these were not discussed in the progress note and have been removed from the diagnosis codes. Guanakito Juarez(Attending)

## 2022-03-11 NOTE — ED PROVIDER NOTE - CLINICAL SUMMARY MEDICAL DECISION MAKING FREE TEXT BOX
79 y/o female with PMHx of dementia, Afib, HTN presents to the ED s/p unwitnessed fall. Exam: abrasion to the nasal bridge, forehead, left maxilla. Will obtain labs, CT, reassess. 89 y/o female with PMHx of dementia, Afib, HTN presents to the ED s/p unwitnessed fall. Exam: abrasion to the nasal bridge, forehead, left maxilla. Will obtain labs, CT, reassess.

## 2022-03-11 NOTE — ED PROVIDER NOTE - NS_ ATTENDINGSCRIBEDETAILS _ED_A_ED_FT
I, Guanakito Juarez MD,  performed the initial face to face bedside interview with this patient regarding history of present illness, review of symptoms and relevant past medical, social and family history.  I completed an independent physical examination.  I was the initial provider who evaluated this patient.   I personally saw the patient and performed a substantive portion of the visit including all aspects of the medical decision making.  The history, relevant review of systems, past medical and surgical history, medical decision making, and physical examination was documented by the scribe in my presence and I attest to the accuracy of the documentation.

## 2022-03-11 NOTE — ED PROVIDER NOTE - PHYSICAL EXAMINATION
Constitutional: NAD AAOx3  Eyes: PERRLA EOMI  Head: Normocephalic   ENT: No mata sign, no raccoon eyes, no hemotympanum, no csf rhinorrhea, no nasal septal hematoma  Mouth: MMM  Cardiac: regular rate   Resp: Lungs CTAB  GI: Abd s/nt/nd  Neuro: CN2-12 intact GCS 15  Skin: No rashes, no bruising to chest, back, abdomen or extremities, abrasion to forehead, nasal bridge and left maxilla   Msk: No midline spinal ttp, full ROM of neck, c-collar cleared clinically and with provocative testing, no ttp of facial bones, no ttp to chest wall, pelvis stable, full ROM of all extremities without any ttp of extremities Constitutional: NAD    Eyes: PERRLA EOMI  Head: Normocephalic   ENT: No mata sign, no raccoon eyes, no csf rhinorrhea, no nasal septal hematoma  Mouth: MMM  Cardiac: regular rate   Resp: Lungs CTAB  GI: Abd s/nt/nd  Neuro: CN2-12 intact GCS 15  Skin: No rashes, no bruising to chest, back, abdomen or extremities, abrasion to forehead, nasal bridge and left maxilla   Msk: No midline spinal ttp,  no ttp of facial bones, no ttp to chest wall, pelvis stable, full ROM of all extremities without any ttp of extremities

## 2022-03-15 RX ORDER — CEFUROXIME AXETIL 250 MG
1 TABLET ORAL
Qty: 14 | Refills: 0
Start: 2022-03-15 | End: 2022-03-21

## 2022-03-15 NOTE — ED POST DISCHARGE NOTE - DETAILS
spoke to Jen eagle take at [facility] and she is aware to hold Macrobid and begin Cefuroxime for UTI and fu with PMD in 1-2 days, needs repeat culture. VALENTE ELENA

## 2022-11-18 NOTE — INPATIENT CERTIFICATION FOR MEDICARE PATIENTS - IN ORDER TO MEET MEDICARE REQUIREMENTS.
Case Management Discharge Note      Final Note: home         Selected Continued Care - Discharged on 11/17/2022 Admission date: 11/16/2022 - Discharge disposition: Home or Self Care                Transportation Services  Private: Car    Final Discharge Disposition Code: 01 - home or self-care  
Discharge Planning Assessment   Hank     Patient Name: Becka Oliva  MRN: [id number]  Today's Date: 11/17/2022    Admit Date: 11/16/2022    Plan: D/C Plan: Home   Discharge Needs Assessment    No documentation.                Discharge Plan     Row Name 11/17/22 9875       Plan    Plan D/C Plan: Home    Plan Comments Spoke with pt at bedside and delivered SPEAR, pt signed and verbalized understanding. Confirmed PCP and Pharmacy. Pt denies dc needs at this time.              Continued Care and Services - Admitted Since 11/16/2022    Coordination has not been started for this encounter.       Expected Discharge Date and Time     Expected Discharge Date Expected Discharge Time    Nov 17, 2022          Demographic Summary     Row Name 11/17/22 0713       General Information    Admission Type observation    Arrived From emergency department    Required Notices Provided Observation Status Notice    Referral Source admission list    Reason for Consult discharge planning    Preferred Language English               Functional Status     Row Name 11/17/22 1364       Functional Status    Usual Activity Tolerance moderate    Current Activity Tolerance fair       Functional Status, IADL    Medications independent    Meal Preparation independent    Housekeeping independent    Laundry independent    Shopping independent    IADL Comments Daughter assists with ADL's as needed       Mental Status    General Appearance WDL WDL                      Patient Forms     Row Name 11/17/22 1330       Patient Forms    Patient Observation Letter Delivered    Delivered to Patient    Method of delivery In person              Met with patient in room wearing PPE: mask    Maintained distance greater than six feet and spent less than 15 minutes in the room    Deedee Yarbrough RN    Phone [phone]  Fax [phone]  
In order to meet Medicare requirements, the clinical documentation must support the information cited in the admission order.  Please be sure to provide detailed and clear documentation about the following in the admitting note/history and physical:

## 2023-04-10 NOTE — ED PROVIDER NOTE - PROGRESS NOTE DETAILS
ct labs unremarkable. daughter at bedside states pt is at baseline. states frank says she slid out of bed. states pt has chronic UTI and is on macrobid. will wait for cultures to see if need to change ABX. states mother doesn't walk at baseline so will not ambulate. Daughter comfortable with patient returning to facility. will dc with follow up and strict return precautions. Guanakito Juarez M.D., Attending Physician Acitretin Counseling:  I discussed with the patient the risks of acitretin including but not limited to hair loss, dry lips/skin/eyes, liver damage, hyperlipidemia, depression/suicidal ideation, photosensitivity.  Serious rare side effects can include but are not limited to pancreatitis, pseudotumor cerebri, bony changes, clot formation/stroke/heart attack.  Patient understands that alcohol is contraindicated since it can result in liver toxicity and significantly prolong the elimination of the drug by many years.

## 2023-08-10 NOTE — DISCHARGE NOTE ADULT - MODE OF TRANSPORTATION
Wheelchair/Stroller Albendazole Counseling:  I discussed with the patient the risks of albendazole including but not limited to cytopenia, kidney damage, nausea/vomiting and severe allergy.  The patient understands that this medication is being used in an off-label manner.

## 2023-09-23 NOTE — ED ADULT NURSE NOTE - NS ED NOTE ABUSE SUSPICION NEGLECT YN
Pt awaiting chest xray. Denies other needs at this time. Call light within reach. Updated on results thus far.    No

## 2023-11-15 NOTE — ED PROVIDER NOTE - OBJECTIVE STATEMENT
Physical Therapy    Patient not seen in therapy.     Unavailable due to request no therapy today.      Re-attempt plan: tomorrow    Educated on the importance of mobility, he continued to refuse. RN aware.        OBJECTIVE                         Therapy procedure time and total treatment time can be found documented on the Time Entry flowsheet   Trauma Alert Called: 89 y/o female with pmhx severe to moderate dementia, spinal stenosis, Afib on Eliquis, HTN presents to ED from [facility] s/p fall this AM at 9:30. Daughter states pt woke up, lost footing and fell. Pt is not currently c/o of any pain. Trauma Alert Called: 89 y/o female with pmhx severe to moderate dementia, spinal stenosis, Afib on Eliquis, HTN presents to ED from [facility] living s/p fall this AM at 9:30. Daughter states it appears that pt woke up, lost footing and fell. Pt is not currently c/o of any pain.  Fall, however, was unwitnessed.  pt presently w/o any complaints, denies HA, N/V, pain to neck/ back/ extremities/ chest/ hips, denies any focal weakness/tingling/ numbness of extremities, denies LOC.

## 2024-02-22 NOTE — ED PROVIDER NOTE - WR ORDER STATUS 2
"Physical Therapy Discharge    Visit Type: Discharge Summary  Visit: 9  Referring Provider: Hammad Martinez MD  Medical Diagnosis (from order): R68.89 - Decreased activity tolerance  R26.89 - Decreased mobility  I63.9, R53.1 - Weakness due to acute cerebrovascular accident (CVA) (CMD)     SUBJECTIVE                                                                                                               ""I'm going home I don't care\"" patient understanding of her inconsistent progress with therapy and need to discharge, agreeable but is overall just sad with her current housing situation.  \""Okay, what's next\""      OBJECTIVE                                                                                                                     Strength  (out of 5 unless noted, standard test position unless noted)   Hip:    - Flexion:         Left: 4-         Right: 4-  Knee:    - Flexion:         Left: 4-         Right: 4-    - Extension:         Left: 4-         Right: 4-              Transfers  - Sit to stand: moderate assist, minimal assist  Mod A initially, with cues and repetition min A, assist for superior trunk shift          Treatment     Therapeutic Exercise        Therapeutic Activity  Reviewed POC with patient   Discussed with patient her current limitations, requires assist with almost all activities, pt initially opposed but understanding twoards the end  Sit to stands x4 - cues for hand placement, feet placement, and ant lean  Amb 10 ft - cues for lifting feet up when ambulating, cues for WBOS  Standing marches 4x6 with BUE support at parallel bars  Standing hip abd 4x6 with BUE support at parallel bars    Exercises  - Seated March  - 1 x daily - 7 x weekly - 3 sets - 10 reps  - Seated Single Leg Hip Abduction  - 1 x daily - 7 x weekly - 3 sets - 10 reps  - Seated Toe Raise  - 1 x daily - 7 x weekly - 3 sets - 10 reps  - Seated Heel Raise  - 1 x daily - 7 x weekly - 3 sets - 10 reps  - Seated Long Arc Quad  " - 1 x daily - 7 x weekly - 3 sets - 10 reps    Skilled input: verbal instruction/cues, demonstration, tactile instruction/cues and posture correction    Writer verbally educated and received verbal consent for hand placement, positioning of patient, and techniques to be performed today from patient for hand placement and palpation for techniques, therapist position for techniques and clothing adjustments for techniques as described above and how they are pertinent to the patient's plan of care. Home Exercise Program  Patient has been educated on the importance of implementing a walking program to promote overall health and reduce current deconditioned status, with elena caretaker  Access Code: [email or web address]  URL: [email or web address]  Date: 02/22/2024  Prepared by: Yosvany Velasco    Exercises  - Seated March  - 1 x daily - 7 x weekly - 3 sets - 10 reps  - Seated Single Leg Hip Abduction  - 1 x daily - 7 x weekly - 3 sets - 10 reps  - Seated Toe Raise  - 1 x daily - 7 x weekly - 3 sets - 10 reps  - Seated Heel Raise  - 1 x daily - 7 x weekly - 3 sets - 10 reps  - Seated Long Arc Quad  - 1 x daily - 7 x weekly - 3 sets - 10 reps      ASSESSMENT                                                                                                          To date the patient has made gains not as expected due to Inconsistent progress, cognition and lack of outside support are patient's biggest barriers. Session emphasis on reassessing objective measurements and goals. Patient exhibits inconsistent progress in therapy. Biggest barriers are cognition and lack of outside support. Fluctuating between mod A x2 and min A x1 each session. Encouraged patient to speak to group home and family. Strongly encouraged patient to continue HEP and amb at her group home safely.    Education:   - Results of above outlined education: Needs reinforcement    PLAN Performed

## 2024-12-10 NOTE — ED ADULT NURSE NOTE - CAS TRG GEN SKIN COLOR
PAST SURGICAL HISTORY:  H/O colonoscopy     S/P craniotomy     S/P percutaneous endoscopic gastrostomy (PEG) tube placement      Normal for race

## 2025-07-18 NOTE — ED ADULT NURSE NOTE - NS ED NURSE RECORD ANOTHER HT AND WT
Don from [facility] called.  He needs the order for compreflex re-faxed.  Please fax to [phone]   Yes